# Patient Record
Sex: FEMALE | Race: WHITE | NOT HISPANIC OR LATINO | URBAN - METROPOLITAN AREA
[De-identification: names, ages, dates, MRNs, and addresses within clinical notes are randomized per-mention and may not be internally consistent; named-entity substitution may affect disease eponyms.]

---

## 2019-10-02 ENCOUNTER — OFFICE VISIT (OUTPATIENT)
Dept: INTERNAL MEDICINE | Facility: CLINIC | Age: 29
End: 2019-10-02
Payer: COMMERCIAL

## 2019-10-02 VITALS
BODY MASS INDEX: 36.58 KG/M2 | HEIGHT: 65 IN | SYSTOLIC BLOOD PRESSURE: 100 MMHG | DIASTOLIC BLOOD PRESSURE: 84 MMHG | WEIGHT: 219.56 LBS | OXYGEN SATURATION: 99 % | HEART RATE: 65 BPM

## 2019-10-02 DIAGNOSIS — Z30.8 ENCOUNTER FOR OTHER CONTRACEPTIVE MANAGEMENT: ICD-10-CM

## 2019-10-02 DIAGNOSIS — Z00.00 ANNUAL PHYSICAL EXAM: Primary | ICD-10-CM

## 2019-10-02 PROCEDURE — 99999 PR PBB SHADOW E&M-NEW PATIENT-LVL III: CPT | Mod: PBBFAC,,, | Performed by: FAMILY MEDICINE

## 2019-10-02 PROCEDURE — 99385 PREV VISIT NEW AGE 18-39: CPT | Mod: S$GLB,,, | Performed by: FAMILY MEDICINE

## 2019-10-02 PROCEDURE — 99999 PR PBB SHADOW E&M-NEW PATIENT-LVL III: ICD-10-PCS | Mod: PBBFAC,,, | Performed by: FAMILY MEDICINE

## 2019-10-02 PROCEDURE — 99385 PR PREVENTIVE VISIT,NEW,18-39: ICD-10-PCS | Mod: S$GLB,,, | Performed by: FAMILY MEDICINE

## 2019-10-02 RX ORDER — NORGESTIMATE AND ETHINYL ESTRADIOL 7DAYSX3 LO
1 KIT ORAL DAILY
COMMUNITY
End: 2021-01-13 | Stop reason: SDUPTHER

## 2019-10-02 RX ORDER — NORETHINDRONE ACETATE AND ETHINYL ESTRADIOL 1.5-30(21)
1 KIT ORAL DAILY
Qty: 30 TABLET | Refills: 2 | Status: SHIPPED | OUTPATIENT
Start: 2019-10-02 | End: 2020-12-02

## 2019-10-02 NOTE — PROGRESS NOTES
Subjective:       Patient ID: Steffanie Parkinson is a 29 y.o. female.    Chief Complaint: establish care    HPI  This patient is new to me.   Steffanie Parkinson is a 29 y.o. year old female without significant PMH who presents today to establish care.    Complains that she has headaches and nausea, intense smells right before her menstrual cycle. Began in past 5 months or so.   Has spotting throughout the month as well. Has Hx of migraines as a child.   She has been taking her current OCP for several months. She currently uses triphasic OCP.     OB/GYN History   G*P0  LMP:   Sexually active: yes  Contraception: OCP    Health Maintenance  Pap smear: Feb 2019 - normal   Mammogram: n/a  Colon Cancer Screening: n/a  DEXA: n/a  Hepatitis C screening: n/a  Flu vaccine: refused  Tetanus vaccine: 2012  PNA vaccine: n/a  Shingles vaccine: n/a    I personally reviewed Past Medical History, Past Surgical History, Social History, and Family History    Review of Systems   Constitutional: Negative for activity change, chills, fatigue, fever and unexpected weight change.   HENT: Negative for congestion, hearing loss, rhinorrhea, sore throat and trouble swallowing.    Eyes: Negative for discharge and visual disturbance.   Respiratory: Negative for cough, chest tightness, shortness of breath and wheezing.    Cardiovascular: Negative for chest pain, palpitations and leg swelling.   Gastrointestinal: Negative for abdominal pain, blood in stool, constipation, diarrhea, nausea and vomiting.   Endocrine: Negative for polydipsia and polyuria.   Genitourinary: Positive for menstrual problem. Negative for difficulty urinating, dysuria, frequency, hematuria and urgency.   Musculoskeletal: Negative for arthralgias, joint swelling, myalgias and neck pain.   Skin: Negative for rash.   Neurological: Positive for headaches. Negative for dizziness, syncope and weakness.   Psychiatric/Behavioral: Negative for confusion, dysphoric mood and sleep  "disturbance. The patient is not nervous/anxious.        Objective:      Vitals:    10/02/19 1346   BP: 100/84   Pulse: 65   SpO2: 99%   Weight: 99.6 kg (219 lb 9.3 oz)   Height: 5' 5" (1.651 m)     Physical Exam   Constitutional: She is oriented to person, place, and time. She appears well-developed and well-nourished. No distress.   HENT:   Head: Normocephalic and atraumatic.   Right Ear: Hearing normal.   Left Ear: Hearing normal.   Nose: Nose normal.   Mouth/Throat: Oropharynx is clear and moist and mucous membranes are normal. No oropharyngeal exudate.   Eyes: Pupils are equal, round, and reactive to light. Conjunctivae and lids are normal.   Neck: Normal range of motion. No thyroid mass and no thyromegaly present.   Cardiovascular: Normal rate, regular rhythm, S1 normal, S2 normal and intact distal pulses.   No murmur heard.  No lower extremity edema.    Pulmonary/Chest: Effort normal and breath sounds normal. No respiratory distress.   Abdominal: Soft. Normal appearance and bowel sounds are normal. There is no tenderness.   Lymphadenopathy:     She has no cervical adenopathy.        Right: No supraclavicular adenopathy present.        Left: No supraclavicular adenopathy present.   Neurological: She is alert and oriented to person, place, and time.   Skin: Skin is warm and dry. No rash noted.   Psychiatric: She has a normal mood and affect. Her behavior is normal. Thought content normal.   Nursing note and vitals reviewed.      Assessment:       1. Annual physical exam    2. Encounter for other contraceptive management        Plan:   Diagnoses and all orders for this visit:    Annual physical exam    Encounter for other contraceptive management  - Will try switching to another birth control to see if side effects resolve. Patient may have menstrual migraines. Recommended she see gyn as well.   -     norethindrone-ethinyl estradiol-iron (MICROGESTIN FE1.5/30) 1.5 mg-30 mcg (21)/75 mg (7) tablet; Take 1 tablet by " mouth once daily.

## 2020-01-14 ENCOUNTER — OFFICE VISIT (OUTPATIENT)
Dept: INTERNAL MEDICINE | Facility: CLINIC | Age: 30
End: 2020-01-14
Payer: COMMERCIAL

## 2020-01-14 VITALS
HEART RATE: 64 BPM | HEIGHT: 65 IN | BODY MASS INDEX: 37.03 KG/M2 | DIASTOLIC BLOOD PRESSURE: 74 MMHG | OXYGEN SATURATION: 99 % | WEIGHT: 222.25 LBS | SYSTOLIC BLOOD PRESSURE: 120 MMHG

## 2020-01-14 DIAGNOSIS — F41.1 GENERALIZED ANXIETY DISORDER: Primary | ICD-10-CM

## 2020-01-14 DIAGNOSIS — Z00.00 ANNUAL PHYSICAL EXAM: ICD-10-CM

## 2020-01-14 DIAGNOSIS — Z13.1 SCREENING FOR DIABETES MELLITUS: ICD-10-CM

## 2020-01-14 DIAGNOSIS — Z13.220 SCREENING FOR LIPID DISORDERS: ICD-10-CM

## 2020-01-14 PROCEDURE — 3008F BODY MASS INDEX DOCD: CPT | Mod: CPTII,S$GLB,, | Performed by: FAMILY MEDICINE

## 2020-01-14 PROCEDURE — 99214 OFFICE O/P EST MOD 30 MIN: CPT | Mod: S$GLB,,, | Performed by: FAMILY MEDICINE

## 2020-01-14 PROCEDURE — 3008F PR BODY MASS INDEX (BMI) DOCUMENTED: ICD-10-PCS | Mod: CPTII,S$GLB,, | Performed by: FAMILY MEDICINE

## 2020-01-14 PROCEDURE — 99999 PR PBB SHADOW E&M-EST. PATIENT-LVL III: ICD-10-PCS | Mod: PBBFAC,,, | Performed by: FAMILY MEDICINE

## 2020-01-14 PROCEDURE — 99214 PR OFFICE/OUTPT VISIT, EST, LEVL IV, 30-39 MIN: ICD-10-PCS | Mod: S$GLB,,, | Performed by: FAMILY MEDICINE

## 2020-01-14 PROCEDURE — 99999 PR PBB SHADOW E&M-EST. PATIENT-LVL III: CPT | Mod: PBBFAC,,, | Performed by: FAMILY MEDICINE

## 2020-01-14 RX ORDER — PROPRANOLOL HYDROCHLORIDE 10 MG/1
10 TABLET ORAL 3 TIMES DAILY PRN
Qty: 90 TABLET | Refills: 0 | Status: SHIPPED | OUTPATIENT
Start: 2020-01-14 | End: 2020-02-07

## 2020-01-14 RX ORDER — ESCITALOPRAM OXALATE 10 MG/1
10 TABLET ORAL DAILY
Qty: 30 TABLET | Refills: 2 | Status: SHIPPED | OUTPATIENT
Start: 2020-01-14 | End: 2020-04-13

## 2020-01-14 NOTE — PROGRESS NOTES
Subjective:       Patient ID: Steffanie Parkinson is a 29 y.o. female.    Chief Complaint: anxiety     HPI  Steffanie Parkinson is a 29 y.o. year old female without significant PMH who presents today complaining of anxiety.     Patient complains of anxiety episodes in which her hands are tingling, she has trouble concentrating, feels more irritable, feels worried. Says she is easily overwhelmed. Denies feeling down or depressed. Denies suicidal ideation. Sleeping well. Appetite ok.   She began to see a therapist 5-6 years ago, feels that this does help.  She tried a friend's propranolol 10 mg once and it helped with her anxiety.     OB/GYN History   G*P0  LMP:   Sexually active: yes  Contraception: OCP    Health Maintenance  Pap smear: Feb 2019 - normal   Mammogram: n/a  Colon Cancer Screening: n/a  DEXA: n/a  Hepatitis C screening: n/a  Flu vaccine: refused  Tetanus vaccine: 2012 per patient   PNA vaccine: n/a  Shingles vaccine: n/a    I personally reviewed Past Medical History, Past Surgical History, Social History, and Family History    Review of Systems   Constitutional: Negative for chills, fatigue, fever and unexpected weight change.   HENT: Negative for congestion, hearing loss, rhinorrhea and sore throat.    Eyes: Negative for visual disturbance.   Respiratory: Negative for cough, shortness of breath and wheezing.    Cardiovascular: Negative for chest pain, palpitations and leg swelling.   Gastrointestinal: Negative for abdominal pain, constipation, diarrhea, nausea and vomiting.   Genitourinary: Negative for dysuria, frequency, menstrual problem and urgency.   Musculoskeletal: Negative for arthralgias and myalgias.   Skin: Negative for rash.   Neurological: Negative for dizziness, syncope and headaches.   Psychiatric/Behavioral: Negative for dysphoric mood, self-injury, sleep disturbance and suicidal ideas. The patient is nervous/anxious.        Objective:      Vitals:    01/14/20 1116   BP: 120/74   Pulse: 64  "  SpO2: 99%   Weight: 100.8 kg (222 lb 3.6 oz)   Height: 5' 5" (1.651 m)     Physical Exam   Constitutional: She is oriented to person, place, and time. She appears well-developed and well-nourished. No distress.   HENT:   Head: Normocephalic and atraumatic.   Eyes: Conjunctivae and EOM are normal.   Neck: Normal range of motion.   Cardiovascular: Normal rate, regular rhythm and normal heart sounds.   No murmur heard.  Pulmonary/Chest: Effort normal and breath sounds normal. No respiratory distress.   Abdominal: Soft. Bowel sounds are normal. There is no tenderness.   Neurological: She is alert and oriented to person, place, and time.   Skin: Skin is warm and dry. No rash noted.   Psychiatric: She has a normal mood and affect. Her behavior is normal. Thought content normal.   Nursing note and vitals reviewed.      Assessment:       1. Generalized anxiety disorder    2. Annual physical exam    3. Screening for diabetes mellitus    4. Screening for lipid disorders        Plan:     Generalized anxiety disorder  This is a new problem. Discussed treatment options with patient. She agreed to pharmacotherapy. Will start Lexapro. Discussed side effects. RTC in 6-8 weeks for follow-up. Discussed lifestyle changes such as regular exercise, meditation, cutting back on caffeine. Will also try propanolol PRN.   -     escitalopram oxalate (LEXAPRO) 10 MG tablet; Take 1 tablet (10 mg total) by mouth once daily.  Dispense: 30 tablet; Refill: 2  -     propranolol (INDERAL) 10 MG tablet; Take 1 tablet (10 mg total) by mouth 3 (three) times daily as needed (for anxiety).  Dispense: 90 tablet; Refill: 0    Annual physical exam  -     CBC auto differential; Future; Expected date: 01/14/2020  -     Comprehensive metabolic panel; Future; Expected date: 01/14/2020  -     Lipid panel; Future; Expected date: 01/14/2020  -     TSH; Future; Expected date: 01/14/2020    Screening for diabetes mellitus  -     Comprehensive metabolic panel; " Future; Expected date: 01/14/2020    Screening for lipid disorders  -     Lipid panel; Future; Expected date: 01/14/2020

## 2020-01-27 PROBLEM — F41.1 GENERALIZED ANXIETY DISORDER: Status: ACTIVE | Noted: 2020-01-27

## 2020-02-07 DIAGNOSIS — F41.1 GENERALIZED ANXIETY DISORDER: ICD-10-CM

## 2020-02-07 RX ORDER — PROPRANOLOL HYDROCHLORIDE 10 MG/1
10 TABLET ORAL 3 TIMES DAILY PRN
Qty: 90 TABLET | Refills: 0 | Status: SHIPPED | OUTPATIENT
Start: 2020-02-07 | End: 2020-08-24

## 2020-04-11 DIAGNOSIS — F41.1 GENERALIZED ANXIETY DISORDER: ICD-10-CM

## 2020-04-13 RX ORDER — ESCITALOPRAM OXALATE 10 MG/1
TABLET ORAL
Qty: 90 TABLET | Refills: 0 | Status: SHIPPED | OUTPATIENT
Start: 2020-04-13 | End: 2020-07-08

## 2020-07-15 ENCOUNTER — TELEPHONE (OUTPATIENT)
Dept: INTERNAL MEDICINE | Facility: CLINIC | Age: 30
End: 2020-07-15

## 2020-07-15 DIAGNOSIS — R19.7 DIARRHEA, UNSPECIFIED TYPE: Primary | ICD-10-CM

## 2020-07-15 NOTE — TELEPHONE ENCOUNTER
----- Message from Azalia Hutchins sent at 7/15/2020  2:10 PM CDT -----  Contact: Self [phone]  Type: Patient Call Back    Who called: Self    What is the request in detail: pt is calling because she is having a lot of stomach issues and she has been having diarrhea for 6 days now and she tested negative for COVID but she wondering what she should do    Can the clinic reply by [facility]? Call back    Would the patient rather a call back or a response via [facility]? Call back    Best call back number: [phone]

## 2020-07-16 NOTE — TELEPHONE ENCOUNTER
Please see if there is an opening for her to come in and see me or someone else in the office to evaluate this diarrhea.  If we do not have any openings then we can try having her see GI.    Thanks!

## 2020-07-16 NOTE — TELEPHONE ENCOUNTER
Scheduled patient an appointment with ADIEL Wallace for her to call the office back to confirm appointment.

## 2020-07-16 NOTE — PROGRESS NOTES
Subjective:       Patient ID: Steffanie Parkinson is a 30 y.o. female.    Chief Complaint:  Diarrhea     HPI  Steffanie Parkinson is a 30 y.o. year old female with obesity, anxiety who presents today complaining of diarrhea.    Patient complains of 6-7 days of diarrhea.  Describes her stool as liquid.  Says she also has urgency and has had accidents twice.  Said that at 1 point she didn't even realize that she was losing stool.  Last week she did have some abdominal cramping, but no pain now.  Said heating pad did help.  The pain was located mainly on the left side.  Denies nausea, vomiting, fever, blood in stool, appetite loss  She does mention occasionally having diarrhea off and on for many years.  This is the worst that it has been.  She recently cut out dairy, bread, pasta as much as possible to see if that helped.  She is currently mainly eating meat and vegetables.  Said she thinks it helped a little bit.  She is drinking Gatorade.  She has tried Kaopectate, Imodium, Tylenol.  Kaopectate seems to work well for her.  Denies any recent travel, camping.  No sick contacts.  She is otherwise feeling well.    Anxiety - compliant with Lexapro 10 mg daily.  Reports her symptoms are well controlled now.  She has not needed to use propranolol very much lately.    OB/GYN History   G*P0  LMP: 1.5 weeks ago  Sexually active: yes  Contraception: OCP    Health Maintenance  Pap smear: Feb 2019 - normal   Mammogram: n/a  Colon Cancer Screening: n/a  DEXA: n/a  Hepatitis C screening: n/a  Flu vaccine: refused  Tetanus vaccine: 2012 per patient   PNA vaccine: n/a  Shingles vaccine: n/a    I personally reviewed Past Medical History, Past Surgical History, Social History, and Family History    Review of Systems   Constitutional: Negative for chills, fatigue and fever.   HENT: Negative for congestion, hearing loss and rhinorrhea.    Eyes: Negative for visual disturbance.   Respiratory: Negative for cough, shortness of breath and  "wheezing.    Cardiovascular: Negative for chest pain.   Gastrointestinal: Positive for diarrhea. Negative for abdominal pain, blood in stool, constipation, nausea and vomiting.   Skin: Negative for rash.   Neurological: Negative for dizziness, syncope and headaches.   Psychiatric/Behavioral: Negative for dysphoric mood and sleep disturbance. The patient is not nervous/anxious.        Objective:      Vitals:    07/17/20 1423   BP: 127/77   Pulse: 88   SpO2: 97%   Weight: 105.2 kg (231 lb 14.8 oz)   Height: 5' 5" (1.651 m)     Physical Exam  Vitals signs and nursing note reviewed.   Constitutional:       General: She is not in acute distress.     Appearance: She is well-developed. She is obese.   HENT:      Head: Normocephalic and atraumatic.   Eyes:      Conjunctiva/sclera: Conjunctivae normal.   Neck:      Musculoskeletal: Normal range of motion.   Cardiovascular:      Rate and Rhythm: Normal rate and regular rhythm.      Heart sounds: Normal heart sounds. No murmur.   Pulmonary:      Effort: Pulmonary effort is normal. No respiratory distress.      Breath sounds: Normal breath sounds.   Abdominal:      General: Bowel sounds are normal.      Palpations: Abdomen is soft.      Tenderness: There is no abdominal tenderness.   Skin:     General: Skin is warm and dry.      Findings: No rash.   Neurological:      Mental Status: She is alert and oriented to person, place, and time.   Psychiatric:         Behavior: Behavior normal.         Thought Content: Thought content normal.         Assessment:       1. Diarrhea, unspecified type    2. Generalized anxiety disorder    3. Annual physical exam    4. Screening for diabetes mellitus        Plan:     Diarrhea, unspecified type  This new problem. Labs and stool studies ordered to evaluate due to duration of diarrhea and chronicity.  Advised patient to try bland diet for the next few days.  She will continue Kaopectate as needed.  Encouraged her to work on staying hydrated as " well.  May need colonoscopy if it continues.  Return to clinic if symptoms worsen or do not improve.  -     Tissue transglutaminase, IgA; Future; Expected date: 07/17/2020  -     IgA; Future; Expected date: 07/17/2020  -     Comprehensive metabolic panel; Future; Expected date: 07/17/2020  -     Occult blood x 1, stool; Future; Expected date: 07/17/2020  -     WBC, Stool; Future; Expected date: 07/17/2020  -     Calprotectin; Future; Expected date: 07/17/2020  -     Giardia / Cryptosporidum, EIA; Future; Expected date: 07/17/2020  -     Stool Exam-Ova,Cysts,Parasites; Future; Expected date: 07/17/2020  -     Clostridium difficile EIA; Future; Expected date: 07/17/2020  -     Stool culture; Future; Expected date: 07/17/2020    Generalized anxiety disorder  Controlled. Continue current medication regimen.

## 2020-07-17 ENCOUNTER — OFFICE VISIT (OUTPATIENT)
Dept: INTERNAL MEDICINE | Facility: CLINIC | Age: 30
End: 2020-07-17
Payer: COMMERCIAL

## 2020-07-17 ENCOUNTER — LAB VISIT (OUTPATIENT)
Dept: LAB | Facility: OTHER | Age: 30
End: 2020-07-17
Attending: FAMILY MEDICINE
Payer: COMMERCIAL

## 2020-07-17 VITALS
WEIGHT: 231.94 LBS | OXYGEN SATURATION: 97 % | HEART RATE: 88 BPM | BODY MASS INDEX: 38.64 KG/M2 | SYSTOLIC BLOOD PRESSURE: 127 MMHG | DIASTOLIC BLOOD PRESSURE: 77 MMHG | HEIGHT: 65 IN

## 2020-07-17 DIAGNOSIS — Z00.00 ANNUAL PHYSICAL EXAM: ICD-10-CM

## 2020-07-17 DIAGNOSIS — Z13.1 SCREENING FOR DIABETES MELLITUS: ICD-10-CM

## 2020-07-17 DIAGNOSIS — R19.7 DIARRHEA, UNSPECIFIED TYPE: ICD-10-CM

## 2020-07-17 DIAGNOSIS — F41.1 GENERALIZED ANXIETY DISORDER: ICD-10-CM

## 2020-07-17 DIAGNOSIS — R19.7 DIARRHEA, UNSPECIFIED TYPE: Primary | ICD-10-CM

## 2020-07-17 LAB
ALBUMIN SERPL BCP-MCNC: 3.3 G/DL (ref 3.5–5.2)
ALP SERPL-CCNC: 33 U/L (ref 55–135)
ALT SERPL W/O P-5'-P-CCNC: 28 U/L (ref 10–44)
ANION GAP SERPL CALC-SCNC: 11 MMOL/L (ref 8–16)
AST SERPL-CCNC: 25 U/L (ref 10–40)
BASOPHILS # BLD AUTO: 0.04 K/UL (ref 0–0.2)
BASOPHILS NFR BLD: 0.3 % (ref 0–1.9)
BILIRUB SERPL-MCNC: 0.3 MG/DL (ref 0.1–1)
BUN SERPL-MCNC: 12 MG/DL (ref 6–20)
CALCIUM SERPL-MCNC: 8.9 MG/DL (ref 8.7–10.5)
CHLORIDE SERPL-SCNC: 105 MMOL/L (ref 95–110)
CO2 SERPL-SCNC: 24 MMOL/L (ref 23–29)
CREAT SERPL-MCNC: 0.8 MG/DL (ref 0.5–1.4)
DIFFERENTIAL METHOD: ABNORMAL
EOSINOPHIL # BLD AUTO: 0.5 K/UL (ref 0–0.5)
EOSINOPHIL NFR BLD: 3.4 % (ref 0–8)
ERYTHROCYTE [DISTWIDTH] IN BLOOD BY AUTOMATED COUNT: 12.5 % (ref 11.5–14.5)
EST. GFR  (AFRICAN AMERICAN): >60 ML/MIN/1.73 M^2
EST. GFR  (NON AFRICAN AMERICAN): >60 ML/MIN/1.73 M^2
GLUCOSE SERPL-MCNC: 102 MG/DL (ref 70–110)
HCT VFR BLD AUTO: 38.4 % (ref 37–48.5)
HGB BLD-MCNC: 12.7 G/DL (ref 12–16)
IGA SERPL-MCNC: 183 MG/DL (ref 40–350)
IMM GRANULOCYTES # BLD AUTO: 0.04 K/UL (ref 0–0.04)
IMM GRANULOCYTES NFR BLD AUTO: 0.3 % (ref 0–0.5)
LYMPHOCYTES # BLD AUTO: 4.1 K/UL (ref 1–4.8)
LYMPHOCYTES NFR BLD: 30.3 % (ref 18–48)
MCH RBC QN AUTO: 28.8 PG (ref 27–31)
MCHC RBC AUTO-ENTMCNC: 33.1 G/DL (ref 32–36)
MCV RBC AUTO: 87 FL (ref 82–98)
MONOCYTES # BLD AUTO: 0.7 K/UL (ref 0.3–1)
MONOCYTES NFR BLD: 5 % (ref 4–15)
NEUTROPHILS # BLD AUTO: 8.2 K/UL (ref 1.8–7.7)
NEUTROPHILS NFR BLD: 60.7 % (ref 38–73)
NRBC BLD-RTO: 0 /100 WBC
PLATELET # BLD AUTO: 309 K/UL (ref 150–350)
PMV BLD AUTO: 9.6 FL (ref 9.2–12.9)
POTASSIUM SERPL-SCNC: 3.9 MMOL/L (ref 3.5–5.1)
PROT SERPL-MCNC: 6.5 G/DL (ref 6–8.4)
RBC # BLD AUTO: 4.41 M/UL (ref 4–5.4)
SODIUM SERPL-SCNC: 140 MMOL/L (ref 136–145)
TSH SERPL DL<=0.005 MIU/L-ACNC: 3.84 UIU/ML (ref 0.4–4)
WBC # BLD AUTO: 13.5 K/UL (ref 3.9–12.7)

## 2020-07-17 PROCEDURE — 36415 COLL VENOUS BLD VENIPUNCTURE: CPT

## 2020-07-17 PROCEDURE — 3008F PR BODY MASS INDEX (BMI) DOCUMENTED: ICD-10-PCS | Mod: CPTII,S$GLB,, | Performed by: FAMILY MEDICINE

## 2020-07-17 PROCEDURE — 99999 PR PBB SHADOW E&M-EST. PATIENT-LVL III: CPT | Mod: PBBFAC,,, | Performed by: FAMILY MEDICINE

## 2020-07-17 PROCEDURE — 83036 HEMOGLOBIN GLYCOSYLATED A1C: CPT

## 2020-07-17 PROCEDURE — 82784 ASSAY IGA/IGD/IGG/IGM EACH: CPT

## 2020-07-17 PROCEDURE — 99214 OFFICE O/P EST MOD 30 MIN: CPT | Mod: S$GLB,,, | Performed by: FAMILY MEDICINE

## 2020-07-17 PROCEDURE — 3008F BODY MASS INDEX DOCD: CPT | Mod: CPTII,S$GLB,, | Performed by: FAMILY MEDICINE

## 2020-07-17 PROCEDURE — 99999 PR PBB SHADOW E&M-EST. PATIENT-LVL III: ICD-10-PCS | Mod: PBBFAC,,, | Performed by: FAMILY MEDICINE

## 2020-07-17 PROCEDURE — 85025 COMPLETE CBC W/AUTO DIFF WBC: CPT

## 2020-07-17 PROCEDURE — 99214 PR OFFICE/OUTPT VISIT, EST, LEVL IV, 30-39 MIN: ICD-10-PCS | Mod: S$GLB,,, | Performed by: FAMILY MEDICINE

## 2020-07-17 PROCEDURE — 83516 IMMUNOASSAY NONANTIBODY: CPT

## 2020-07-17 PROCEDURE — 80053 COMPREHEN METABOLIC PANEL: CPT

## 2020-07-17 PROCEDURE — 84443 ASSAY THYROID STIM HORMONE: CPT

## 2020-07-17 NOTE — PATIENT INSTRUCTIONS

## 2020-07-18 LAB
ESTIMATED AVG GLUCOSE: 100 MG/DL (ref 68–131)
HBA1C MFR BLD HPLC: 5.1 % (ref 4–5.6)

## 2020-07-20 LAB — TTG IGA SER-ACNC: 4 UNITS

## 2020-07-27 ENCOUNTER — LAB VISIT (OUTPATIENT)
Dept: LAB | Facility: OTHER | Age: 30
End: 2020-07-27
Attending: FAMILY MEDICINE
Payer: COMMERCIAL

## 2020-07-27 DIAGNOSIS — R19.7 DIARRHEA, UNSPECIFIED TYPE: ICD-10-CM

## 2020-07-27 LAB
C DIFF GDH STL QL: NEGATIVE
C DIFF TOX A+B STL QL IA: NEGATIVE
OB PNL STL: NEGATIVE
WBC #/AREA STL HPF: NORMAL /[HPF]

## 2020-07-27 PROCEDURE — 87324 CLOSTRIDIUM AG IA: CPT

## 2020-07-27 PROCEDURE — 89055 LEUKOCYTE ASSESSMENT FECAL: CPT

## 2020-07-27 PROCEDURE — 82272 OCCULT BLD FECES 1-3 TESTS: CPT

## 2020-07-27 PROCEDURE — 87427 SHIGA-LIKE TOXIN AG IA: CPT

## 2020-07-27 PROCEDURE — 87046 STOOL CULTR AEROBIC BACT EA: CPT

## 2020-07-27 PROCEDURE — 87209 SMEAR COMPLEX STAIN: CPT

## 2020-07-27 PROCEDURE — 83993 ASSAY FOR CALPROTECTIN FECAL: CPT

## 2020-07-27 PROCEDURE — 87045 FECES CULTURE AEROBIC BACT: CPT

## 2020-07-27 PROCEDURE — 87449 NOS EACH ORGANISM AG IA: CPT

## 2020-07-27 PROCEDURE — 87329 GIARDIA AG IA: CPT

## 2020-07-28 DIAGNOSIS — A07.1 GIARDIASIS: ICD-10-CM

## 2020-07-28 LAB
CRYPTOSP AG STL QL IA: NEGATIVE
G LAMBLIA AG STL QL IA: POSITIVE

## 2020-07-28 RX ORDER — TINIDAZOLE 500 MG/1
2 TABLET ORAL ONCE
Qty: 4 TABLET | Refills: 0 | Status: SHIPPED | OUTPATIENT
Start: 2020-07-28 | End: 2020-07-28

## 2020-07-29 LAB
E COLI SXT1 STL QL IA: NEGATIVE
E COLI SXT2 STL QL IA: NEGATIVE
O+P STL MICRO: ABNORMAL

## 2020-07-30 LAB — BACTERIA STL CULT: NORMAL

## 2020-08-04 LAB — CALPROTECTIN STL-MCNT: 78.6 MCG/G

## 2020-10-10 DIAGNOSIS — F41.1 GENERALIZED ANXIETY DISORDER: ICD-10-CM

## 2020-10-10 NOTE — LETTER
October 23, 2020    Steffanie Iggy  [address] [address]             [facility] [address]  [place] [address]  Phone: [phone]  Fax: [phone] .Steffanie Blanchard!    Your Lexapro refill was denied. Dr. Breanne Wood is no longer practicing with [facility]. Please let us know if you are interested in establishing care with another provider and we can assist you with making an appointment. The physicians who are currently accepting new patients are as follows:    [facility] ([address], [address], [place] [address]):  - Ortiz Paige M.D. (part-time on Mondays and Fridays)  - Aditi Klein M.D.  - Kenneth Huang M.D.  - Javier Vasquez M.D.  - Jacquelin Anderson M.D.    [facility] ([address] [place] [address]):  - Tara Will M.D.  - Lilo Davis M.D.  - Waqas Blackburn M.D.  - Andreea Mism M.D.  - Filippo Orosco M.D. (Monday and Friday only at [facility])    [facility] ([address] [place] [address]):  - Sharon Montesinos M.D.  - Stacy Montilla M.D.  - Filippo Pang M.D.  - MERRITT Torres M.D.  - Carlos Rodriguez M.D.    We look forward to further assisting you. Please let us know if you have any additional questions or concerns.    Thank you!  [facility]

## 2020-10-22 RX ORDER — ESCITALOPRAM OXALATE 10 MG/1
TABLET ORAL
Qty: 90 TABLET | Refills: 0 | OUTPATIENT
Start: 2020-10-22

## 2020-12-01 NOTE — PROGRESS NOTES
[facility] Note    Chief Complaint      Chief Complaint   Patient presents with    Establish Care       History of Present Illness      Steffanie Parkinson is a 30 y.o. WF with MERCEDES, Morbid Obesity, and migraines without aura presents to est PCP and for fu MERCEDES.     MERCEDES - Pt on Lexapro 10 mg/d but ran out 4 wks ago. Warned pt about potential S.E. Pt aware of FDA/BB warning. Pt aware of potential for withdrawal with abrupt discontinuation.   Pt will alert MD for any concerns incl SI/HI.  She did gain wt unsure if related to her Lexapro.  Could consider changing to Effexor or Fluoxetine. She would like to try Fluoxetine.     Morbid obesity - BMI - 40.17 -  Up 22 lb since Oct. 2019.     Migraine - Occur around cycles.  + Photosensitiviy.  No Aura.     HCM - Flu - none - defers;  Tdap - '11? - pt defers; Gardasil - none;  MGM - none? - due at 40;  PAP - '19 - at [facility] - wnl; Hep C Screen - none- declines;  HIV Screen - none - declnes; C-scope - none;   Prev PCP - Dr. Wood; Ob/GYN -     Past Medical History:  Past Medical History:   Diagnosis Date    Migraine without aura and without status migrainosus, not intractable 12/2/2020    Migraines     Morbid obesity 12/2/2020       Past Surgical History:   has a past surgical history that includes none.    Family History:  family history includes Alcohol abuse in her maternal grandfather; Bipolar disorder in her maternal aunt; Drug abuse in her mother; Kidney disease in her mother; Nephrolithiasis in her maternal uncle; No Known Problems in her maternal grandmother.     Social History:  Social History     Tobacco Use    Smoking status: Never Smoker    Smokeless tobacco: Never Used   Substance Use Topics    Alcohol use: Yes     Alcohol/week: 9.0 standard drinks     Types: 9 Glasses of wine per week     Frequency: 4 or more times a week     Drinks per session: 1 or 2     Binge frequency: Never    Drug use: Never       Review of Systems   Constitutional:  Negative for chills, diaphoresis and fever.   HENT: Negative for hearing loss and tinnitus.    Eyes: Negative for blurred vision and double vision.   Respiratory: Positive for cough. Negative for shortness of breath.         Slight dry cough - COVID neg recently - 10 days ago.    Cardiovascular: Negative for chest pain and palpitations.   Gastrointestinal: Positive for diarrhea and nausea. Negative for abdominal pain, blood in stool, constipation, heartburn, melena and vomiting.        Off and on.    Genitourinary: Negative for dysuria and frequency.   Musculoskeletal: Negative for joint pain and myalgias.   Skin: Negative for itching and rash.   Neurological: Negative for dizziness and headaches.   Endo/Heme/Allergies: Does not bruise/bleed easily.   Psychiatric/Behavioral: Negative for depression and suicidal ideas.        Medications:  Outpatient Encounter Medications as of 12/2/2020   Medication Sig Dispense Refill    norgestimate-ethinyl estradiol (ORTHO TRI-CYCLEN LO, 28,) 0.18/0.215/0.25 mg-25 mcg tablet Take 1 tablet by mouth once daily.      propranoloL (INDERAL) 10 MG tablet TAKE 1 TABLET BY MOUTH THREE TIMES A DAY AS NEEDED FOR ANXIETY 90 tablet 0    [DISCONTINUED] escitalopram oxalate (LEXAPRO) 10 MG tablet Take 1 tablet (10 mg total) by mouth once daily. 90 tablet 0    FLUoxetine 10 MG capsule Take 1 capsule (10 mg total) by mouth once daily. 30 capsule 1    [DISCONTINUED] desogestreL-ethinyl estradioL (ENSKYCE) 0.15-0.03 mg per tablet 1 tablet      [DISCONTINUED] norethindrone-ethinyl estradiol-iron (MICROGESTIN FE1.5/30) 1.5 mg-30 mcg (21)/75 mg (7) tablet Take 1 tablet by mouth once daily. (Patient not taking: Reported on 12/2/2020) 30 tablet 2     No facility-administered encounter medications on file as of 12/2/2020.        Current Outpatient Medications:     norgestimate-ethinyl estradiol (ORTHO TRI-CYCLEN LO, 28,) 0.18/0.215/0.25 mg-25 mcg tablet, Take 1 tablet by mouth once daily., Disp:  ", Rfl:     propranoloL (INDERAL) 10 MG tablet, TAKE 1 TABLET BY MOUTH THREE TIMES A DAY AS NEEDED FOR ANXIETY, Disp: 90 tablet, Rfl: 0    FLUoxetine 10 MG capsule, Take 1 capsule (10 mg total) by mouth once daily., Disp: 30 capsule, Rfl: 1    Allergies:  Review of patient's allergies indicates:  No Known Allergies    Health Maintenance:  Immunization History   Administered Date(s) Administered    Td (ADULT) 05/07/2008      Health Maintenance   Topic Date Due    Hepatitis C Screening  1990    Lipid Panel  1990    TETANUS VACCINE  05/07/2018      Objective:      Vital Signs  Temp: 97.8 °F (36.6 °C)  Pulse: 76  Resp: 18  SpO2: 98 %  BP: 124/64  BP Location: Left arm  Patient Position: Sitting  Pain Score: 0-No pain  Height and Weight  Height: 5' 5" (165.1 cm)  Weight: 109.5 kg (241 lb 6.5 oz)  BSA (Calculated - sq m): 2.24 sq meters  BMI (Calculated): 40.2  Weight in (lb) to have BMI = 25: 149.9]    Laboratory:  CBC:  Recent Labs   Lab 07/17/20  1517   WBC 13.50 H   RBC 4.41   Hemoglobin 12.7   Hematocrit 38.4   Platelets 309   MCV 87   MCH 28.8   MCHC 33.1       CMP:  Recent Labs   Lab 07/17/20  1517   Glucose 102   Calcium 8.9   Albumin 3.3 L   Total Protein 6.5   Sodium 140   Potassium 3.9   CO2 24   Chloride 105   BUN 12   Creatinine 0.8   eGFR if  >60   eGFR if non African American >60   Alkaline Phosphatase 33 L   ALT 28   AST 25   Total Bilirubin 0.3       LIPIDS:  Recent Labs   Lab 07/17/20  1517   TSH 3.837       TSH:  Recent Labs   Lab 07/17/20  1517   TSH 3.837       A1C:  Recent Labs   Lab 07/17/20  1517   Hemoglobin A1C 5.1       Physical Exam  Vitals signs reviewed.   Constitutional:       General: She is not in acute distress.     Appearance: Normal appearance. She is not ill-appearing, toxic-appearing or diaphoretic.      Comments: Morbidly obese   HENT:      Head: Normocephalic and atraumatic.      Right Ear: Tympanic membrane normal.      Left Ear: Tympanic membrane " normal.   Eyes:      Extraocular Movements: Extraocular movements intact.      Conjunctiva/sclera: Conjunctivae normal.      Pupils: Pupils are equal, round, and reactive to light.   Cardiovascular:      Rate and Rhythm: Normal rate and regular rhythm.      Pulses: Normal pulses.      Heart sounds: Normal heart sounds.   Pulmonary:      Effort: Pulmonary effort is normal. No respiratory distress.      Breath sounds: Normal breath sounds. No wheezing or rhonchi.   Abdominal:      General: Bowel sounds are normal. There is no distension.      Palpations: Abdomen is soft.      Tenderness: There is no abdominal tenderness. There is no guarding or rebound.   Musculoskeletal: Normal range of motion.   Skin:     General: Skin is warm and dry.   Neurological:      General: No focal deficit present.      Mental Status: She is alert and oriented to person, place, and time.   Psychiatric:         Mood and Affect: Mood normal.         Behavior: Behavior normal.             Assessment:       1. Generalized anxiety disorder    2. Migraine without aura and without status migrainosus, not intractable    3. Morbid obesity        Steffanie Parkinson is a 30 y.o.female with:    1. Generalized anxiety disorder  - FLUoxetine 10 MG capsule; Take 1 capsule (10 mg total) by mouth once daily.  Dispense: 30 capsule; Refill: 1  - Will start a trial of Fluoxetine.     2. Morbid obesity  - Ambulatory referral/consult to Nutrition Services; Future  -Refer to Nutrition.  Rec low carb diet and exercise for wt loss.        Chronic conditions status updated as per HPI.  Other than changes above, cont current medications and maintain follow up with specialists.  Return to clinic in 6 wks to fu well visit.     Tara Will MD  [facility]

## 2020-12-02 ENCOUNTER — OFFICE VISIT (OUTPATIENT)
Dept: PRIMARY CARE CLINIC | Facility: CLINIC | Age: 30
End: 2020-12-02
Payer: COMMERCIAL

## 2020-12-02 VITALS
RESPIRATION RATE: 18 BRPM | TEMPERATURE: 98 F | OXYGEN SATURATION: 98 % | BODY MASS INDEX: 40.22 KG/M2 | HEIGHT: 65 IN | SYSTOLIC BLOOD PRESSURE: 124 MMHG | HEART RATE: 76 BPM | WEIGHT: 241.38 LBS | DIASTOLIC BLOOD PRESSURE: 64 MMHG

## 2020-12-02 DIAGNOSIS — F41.1 GENERALIZED ANXIETY DISORDER: Primary | ICD-10-CM

## 2020-12-02 DIAGNOSIS — E66.01 MORBID OBESITY: ICD-10-CM

## 2020-12-02 PROBLEM — G43.009 MIGRAINE WITHOUT AURA AND WITHOUT STATUS MIGRAINOSUS, NOT INTRACTABLE: Status: ACTIVE | Noted: 2020-12-02

## 2020-12-02 PROCEDURE — 99213 OFFICE O/P EST LOW 20 MIN: CPT | Mod: S$GLB,,, | Performed by: INTERNAL MEDICINE

## 2020-12-02 PROCEDURE — 1126F PR PAIN SEVERITY QUANTIFIED, NO PAIN PRESENT: ICD-10-PCS | Mod: S$GLB,,, | Performed by: INTERNAL MEDICINE

## 2020-12-02 PROCEDURE — 3008F BODY MASS INDEX DOCD: CPT | Mod: CPTII,S$GLB,, | Performed by: INTERNAL MEDICINE

## 2020-12-02 PROCEDURE — 1126F AMNT PAIN NOTED NONE PRSNT: CPT | Mod: S$GLB,,, | Performed by: INTERNAL MEDICINE

## 2020-12-02 PROCEDURE — 99999 PR PBB SHADOW E&M-EST. PATIENT-LVL IV: ICD-10-PCS | Mod: PBBFAC,,, | Performed by: INTERNAL MEDICINE

## 2020-12-02 PROCEDURE — 99999 PR PBB SHADOW E&M-EST. PATIENT-LVL IV: CPT | Mod: PBBFAC,,, | Performed by: INTERNAL MEDICINE

## 2020-12-02 PROCEDURE — 99213 PR OFFICE/OUTPT VISIT, EST, LEVL III, 20-29 MIN: ICD-10-PCS | Mod: S$GLB,,, | Performed by: INTERNAL MEDICINE

## 2020-12-02 PROCEDURE — 3008F PR BODY MASS INDEX (BMI) DOCUMENTED: ICD-10-PCS | Mod: CPTII,S$GLB,, | Performed by: INTERNAL MEDICINE

## 2020-12-02 RX ORDER — FLUOXETINE 10 MG/1
10 CAPSULE ORAL DAILY
Qty: 30 CAPSULE | Refills: 1 | Status: SHIPPED | OUTPATIENT
Start: 2020-12-02 | End: 2020-12-31

## 2020-12-02 RX ORDER — DESOGESTREL AND ETHINYL ESTRADIOL 0.15-0.03
KIT ORAL
COMMUNITY
End: 2020-12-02

## 2020-12-02 NOTE — PATIENT INSTRUCTIONS
Fluoxetine capsules or tablets (Depression/Mood Disorders)  What is this medicine?  FLUOXETINE (floo OX e teen) belongs to a class of drugs known as selective serotonin reuptake inhibitors (SSRIs). It helps to treat mood problems such as depression, obsessive compulsive disorder, and panic attacks. It can also treat certain eating disorders.  How should I use this medicine?  Take this medicine by mouth with a glass of water. Follow the directions on the prescription label. You can take this medicine with or without food. Take your medicine at regular intervals. Do not take it more often than directed. Do not stop taking this medicine suddenly except upon the advice of your doctor. Stopping this medicine too quickly may cause serious side effects or your condition may worsen.  A special MedGuide will be given to you by the pharmacist with each prescription and refill. Be sure to read this information carefully each time.  Talk to your pediatrician regarding the use of this medicine in children. While this drug may be prescribed for children as young as 7 years for selected conditions, precautions do apply.  What side effects may I notice from receiving this medicine?  Side effects that you should report to your doctor or health care professional as soon as possible:  · allergic reactions like skin rash, itching or hives, swelling of the face, lips, or tongue  · breathing problems  · confusion  · eye pain, changes in vision  · fast or irregular heart rate, palpitations  · flu-like fever, chills, cough, muscle or joint aches and pains  · seizures  · suicidal thoughts or other mood changes  · swelling or redness in or around the eye  · tremors  · trouble sleeping  · unusual bleeding or bruising  · unusually tired or weak  · vomiting  Side effects that usually do not require medical attention (report to your doctor or health care professional if they continue or are bothersome):  · change in sex drive or  performance  · diarrhea  · dry mouth  · flushing  · headache  · increased or decreased appetite  · nausea  · sweating  What may interact with this medicine?  Do not take fluoxetine with any of the following medications:  · other medicines containing fluoxetine, like Sarafem or Symbyax  · cisapride  · linezolid  · MAOIs like Carbex, Eldepryl, Marplan, Nardil, and Parnate  · methylene blue (injected into a vein)  · pimozide  · thioridazine  This medicine may also interact with the following medications:  · alcohol  · aspirin and aspirin-like medicines  · carbamazepine  · certain medicines for depression, anxiety, or psychotic disturbances  · certain medicines for migraine headaches like almotriptan, eletriptan, frovatriptan, naratriptan, rizatriptan, sumatriptan, zolmitriptan  · digoxin  · diuretics  · fentanyl  · flecainide  · furazolidone  · isoniazid  · lithium  · medicines for sleep  · medicines that treat or prevent blood clots like warfarin, enoxaparin, and dalteparin  · NSAIDs, medicines for pain and inflammation, like ibuprofen or naproxen  · phenytoin  · procarbazine  · propafenone  · rasagiline  · ritonavir  · supplements like [place]'s wort, kava kava, valerian  · tramadol  · tryptophan  · vinblastine  What if I miss a dose?  If you miss a dose, skip the missed dose and go back to your regular dosing schedule. Do not take double or extra doses.  Where should I keep my medicine?  Keep out of the reach of children.  Store at room temperature between 15 and 30 degrees C (59 and 86 degrees F). Throw away any unused medicine after the expiration date.  What should I tell my health care provider before I take this medicine?  They need to know if you have any of these conditions:  · bipolar disorder or flores  · diabetes  · glaucoma  · liver disease  · psychosis  · seizures  · suicidal thoughts or history of attempted suicide  · an unusual or allergic reaction to fluoxetine, other medicines, foods, dyes, or  preservatives  · pregnant or trying to get pregnant  · breast-feeding  What should I watch for while using this medicine?  Tell your doctor if your symptoms do not get better or if they get worse. Visit your doctor or health care professional for regular checks on your progress. Because it may take several weeks to see the full effects of this medicine, it is important to continue your treatment as prescribed by your doctor.  Patients and their families should watch out for new or worsening thoughts of suicide or depression. Also watch out for sudden changes in feelings such as feeling anxious, agitated, panicky, irritable, hostile, aggressive, impulsive, severely restless, overly excited and hyperactive, or not being able to sleep. If this happens, especially at the beginning of treatment or after a change in dose, call your health care professional.  You may get drowsy or dizzy. Do not drive, use machinery, or do anything that needs mental alertness until you know how this medicine affects you. Do not stand or sit up quickly, especially if you are an older patient. This reduces the risk of dizzy or fainting spells. Alcohol may interfere with the effect of this medicine. Avoid alcoholic drinks.  Your mouth may get dry. Chewing sugarless gum or sucking hard candy, and drinking plenty of water may help. Contact your doctor if the problem does not go away or is severe.  This medicine may affect blood sugar levels. If you have diabetes, check with your doctor or health care professional before you change your diet or the dose of your diabetic medicine.  NOTE:This sheet is a summary. It may not cover all possible information. If you have questions about this medicine, talk to your doctor, pharmacist, or health care provider. Copyright© 2017 [employer]

## 2020-12-30 ENCOUNTER — NUTRITION (OUTPATIENT)
Dept: NUTRITION | Facility: CLINIC | Age: 30
End: 2020-12-30
Payer: COMMERCIAL

## 2020-12-30 VITALS — WEIGHT: 245.38 LBS | BODY MASS INDEX: 40.88 KG/M2 | HEIGHT: 65 IN

## 2020-12-30 DIAGNOSIS — Z71.3 DIETARY COUNSELING: ICD-10-CM

## 2020-12-30 DIAGNOSIS — E66.01 MORBID OBESITY: Primary | ICD-10-CM

## 2020-12-30 PROCEDURE — 97802 MEDICAL NUTRITION INDIV IN: CPT | Mod: S$GLB,,, | Performed by: DIETITIAN, REGISTERED

## 2020-12-30 PROCEDURE — 99999 PR PBB SHADOW E&M-EST. PATIENT-LVL III: CPT | Mod: PBBFAC,,,

## 2020-12-30 PROCEDURE — 99999 PR PBB SHADOW E&M-EST. PATIENT-LVL III: ICD-10-PCS | Mod: PBBFAC,,,

## 2020-12-30 PROCEDURE — 97802 PR MED NUTR THER, 1ST, INDIV, EA 15 MIN: ICD-10-PCS | Mod: S$GLB,,, | Performed by: DIETITIAN, REGISTERED

## 2020-12-30 NOTE — PROGRESS NOTES
"Referring Physician:Tara Will MD     Reason for visit:  Chief Complaint   Patient presents with    Obesity    Nutrition Counseling      Initial Visit    :1990     Allergies Reviewed  Meds Reviewed    Anthropometrics  Weight:111.3 kg (245 lb 6 oz)  Height:5' 5" (1.651 m)  BMI:Body mass index is 40.83 kg/m².   IBW:   56.8 kg  +/-10%    Meds:  Outpatient Medications Prior to Visit   Medication Sig Dispense Refill    FLUoxetine 10 MG capsule Take 1 capsule (10 mg total) by mouth once daily. 30 capsule 1    norgestimate-ethinyl estradiol (ORTHO TRI-CYCLEN LO, 28,) 0.18/0.215/0.25 mg-25 mcg tablet Take 1 tablet by mouth once daily.      propranoloL (INDERAL) 10 MG tablet TAKE 1 TABLET BY MOUTH THREE TIMES A DAY AS NEEDED FOR ANXIETY 90 tablet 0     No facility-administered medications prior to visit.        Food/Drug Interactions Noted:  n/a    Vitamins/Supplements/Herbs:  none    Labs:  Reviewed 2020 labs     Nutrition Prescription:  1704 Kcals/day( 30 kcal/kg IBW),   46 g protein( 0.8 g/kg IBW)     Support System:    Pt states she is recently ; she and her  grocery shop together and share cooking responsibilities    Diet Hx:   Pt states this is the heaviest she has ever been, and reports 40 lb weight gain over past year which she attributes to COVID stay-at-home.  Continues to work from home.  States she has multiple food "cravings" ie meat, cheese, carbs, fast food.  Craves meat especially in the mornings.  States their preferred eating regimen is half plate of rice, and half plate of meat and vegetables cooked together.  Prefers whole, fresh foods to processed foods.  Drinks 2 glasses of wine nightly.     Breakfast: scrambled eggs with tortilla or beans, avocado with tortilla or premade chicken salad sandwich.  Coffee with coconut creamer. 4 oz glass of fresh-squeezed OJ  Lunch:   Eats with , and likes to cook:  Fried rice (with leftover rice) or leftovers.  " Water.  Dinner:   Last night:  Curried pork in coconut milk with alycia rice.  2 glasses wine.  Water.  Snack:  Sliced kosher pickle with block pepperjack cheese and Ritz crackers    Current activity level and/or physical limitations:  No exercise at this time; no apparent physical limitations    Motivation to make changes/anticipated barriers and/or expected adherence:    Pt's reported food cravings may be barrier to adherence    Nutrition-Focus Physical Findings:  Pt wearing face covering per COVID19 protocol; pt appears well nourished    Assessment:  Pt attentive and asked relevant questions about foods/beverages recommended & to avoid; reading food labels; sample meal plan and menus; portion control; grocery shopping and cooking tips; healthy snacks.  All questions answered, and she verbalized understanding of information.  Pt reports she will have challenges reducing her portion sizes of carbs and proteins.    Nutrition Diagnosis:  Obesity RT excess energy intake and physical inactivity AEB BMI > 40    Recommendations:  1500 calorie, low fat, high fiber diet. Exercise goal:  30 minutes per day, 3-5 days per week  Handouts provided and reviewed:  Cardiac-TLC Nutrition Therapy; 1500 Calorie Sample 5-Day Menus; Weight Loss Tips; Cooking Tips for Weight Management; Get Fit Shopping List; Eat Fit Plan...Anytime/Anywhere;  Servings of Carbohydrates for Meal Planning; Walking Works; My Plate Planner;  Heart Healthy Eating:  Shopping Tips and Label Reading Tips; [facility] Snack List for Diabetes      Strategies Implemented:  Portion control    Consultation Time:40 minutes. (Note:  Pt arrived at 10:59 for 10:30 appt)  Communicated with referring healthcare provider:  Consult note available in pt's Epic chart per MD discretion  Follow Up:  Pt provided with dietitian contact number and advised to call with questions or make future appointment if further intervention needed.

## 2021-01-04 ENCOUNTER — PATIENT MESSAGE (OUTPATIENT)
Dept: ADMINISTRATIVE | Facility: HOSPITAL | Age: 31
End: 2021-01-04

## 2021-01-13 ENCOUNTER — OFFICE VISIT (OUTPATIENT)
Dept: PRIMARY CARE CLINIC | Facility: CLINIC | Age: 31
End: 2021-01-13
Payer: COMMERCIAL

## 2021-01-13 VITALS
SYSTOLIC BLOOD PRESSURE: 115 MMHG | TEMPERATURE: 98 F | HEIGHT: 65 IN | HEART RATE: 80 BPM | BODY MASS INDEX: 41 KG/M2 | WEIGHT: 246.06 LBS | DIASTOLIC BLOOD PRESSURE: 72 MMHG | RESPIRATION RATE: 19 BRPM | OXYGEN SATURATION: 99 %

## 2021-01-13 DIAGNOSIS — E66.01 MORBID OBESITY: ICD-10-CM

## 2021-01-13 DIAGNOSIS — F41.1 GENERALIZED ANXIETY DISORDER: ICD-10-CM

## 2021-01-13 DIAGNOSIS — Z00.00 NORMAL PHYSICAL EXAM, ROUTINE: Primary | ICD-10-CM

## 2021-01-13 DIAGNOSIS — Z30.9 ENCOUNTER FOR CONTRACEPTIVE MANAGEMENT, UNSPECIFIED TYPE: ICD-10-CM

## 2021-01-13 DIAGNOSIS — R73.01 IFG (IMPAIRED FASTING GLUCOSE): ICD-10-CM

## 2021-01-13 DIAGNOSIS — Z13.220 SCREENING FOR LIPOID DISORDERS: ICD-10-CM

## 2021-01-13 PROCEDURE — 99395 PREV VISIT EST AGE 18-39: CPT | Mod: S$GLB,,, | Performed by: INTERNAL MEDICINE

## 2021-01-13 PROCEDURE — 99395 PR PREVENTIVE VISIT,EST,18-39: ICD-10-PCS | Mod: S$GLB,,, | Performed by: INTERNAL MEDICINE

## 2021-01-13 PROCEDURE — 1126F AMNT PAIN NOTED NONE PRSNT: CPT | Mod: S$GLB,,, | Performed by: INTERNAL MEDICINE

## 2021-01-13 PROCEDURE — 3008F BODY MASS INDEX DOCD: CPT | Mod: CPTII,S$GLB,, | Performed by: INTERNAL MEDICINE

## 2021-01-13 PROCEDURE — 99999 PR PBB SHADOW E&M-EST. PATIENT-LVL III: ICD-10-PCS | Mod: PBBFAC,,, | Performed by: INTERNAL MEDICINE

## 2021-01-13 PROCEDURE — 99999 PR PBB SHADOW E&M-EST. PATIENT-LVL III: CPT | Mod: PBBFAC,,, | Performed by: INTERNAL MEDICINE

## 2021-01-13 PROCEDURE — 3008F PR BODY MASS INDEX (BMI) DOCUMENTED: ICD-10-PCS | Mod: CPTII,S$GLB,, | Performed by: INTERNAL MEDICINE

## 2021-01-13 PROCEDURE — 1126F PR PAIN SEVERITY QUANTIFIED, NO PAIN PRESENT: ICD-10-PCS | Mod: S$GLB,,, | Performed by: INTERNAL MEDICINE

## 2021-01-13 RX ORDER — NORGESTIMATE AND ETHINYL ESTRADIOL 7DAYSX3 LO
1 KIT ORAL DAILY
Qty: 90 TABLET | Refills: 3 | Status: SHIPPED | OUTPATIENT
Start: 2021-01-13 | End: 2021-11-10

## 2021-04-05 ENCOUNTER — PATIENT MESSAGE (OUTPATIENT)
Dept: ADMINISTRATIVE | Facility: HOSPITAL | Age: 31
End: 2021-04-05

## 2021-04-19 PROBLEM — Z13.220 SCREENING FOR LIPOID DISORDERS: Status: RESOLVED | Noted: 2021-01-13 | Resolved: 2021-04-19

## 2021-04-19 PROBLEM — Z00.00 NORMAL PHYSICAL EXAM, ROUTINE: Status: RESOLVED | Noted: 2021-01-13 | Resolved: 2021-04-19

## 2021-07-07 ENCOUNTER — PATIENT MESSAGE (OUTPATIENT)
Dept: ADMINISTRATIVE | Facility: HOSPITAL | Age: 31
End: 2021-07-07

## 2021-07-14 ENCOUNTER — LAB VISIT (OUTPATIENT)
Dept: LAB | Facility: HOSPITAL | Age: 31
End: 2021-07-14
Attending: INTERNAL MEDICINE
Payer: COMMERCIAL

## 2021-07-14 DIAGNOSIS — Z13.220 SCREENING FOR LIPOID DISORDERS: ICD-10-CM

## 2021-07-14 DIAGNOSIS — Z00.00 NORMAL PHYSICAL EXAM, ROUTINE: ICD-10-CM

## 2021-07-14 DIAGNOSIS — R73.01 IFG (IMPAIRED FASTING GLUCOSE): ICD-10-CM

## 2021-07-14 LAB
BASOPHILS # BLD AUTO: 0.03 K/UL (ref 0–0.2)
BASOPHILS NFR BLD: 0.3 % (ref 0–1.9)
DIFFERENTIAL METHOD: ABNORMAL
EOSINOPHIL # BLD AUTO: 0.6 K/UL (ref 0–0.5)
EOSINOPHIL NFR BLD: 6.2 % (ref 0–8)
ERYTHROCYTE [DISTWIDTH] IN BLOOD BY AUTOMATED COUNT: 13.3 % (ref 11.5–14.5)
ESTIMATED AVG GLUCOSE: 103 MG/DL (ref 68–131)
HBA1C MFR BLD: 5.2 % (ref 4–5.6)
HCT VFR BLD AUTO: 41 % (ref 37–48.5)
HGB BLD-MCNC: 13.1 G/DL (ref 12–16)
IMM GRANULOCYTES # BLD AUTO: 0.02 K/UL (ref 0–0.04)
IMM GRANULOCYTES NFR BLD AUTO: 0.2 % (ref 0–0.5)
LYMPHOCYTES # BLD AUTO: 3.8 K/UL (ref 1–4.8)
LYMPHOCYTES NFR BLD: 38.5 % (ref 18–48)
MCH RBC QN AUTO: 28.2 PG (ref 27–31)
MCHC RBC AUTO-ENTMCNC: 32 G/DL (ref 32–36)
MCV RBC AUTO: 88 FL (ref 82–98)
MONOCYTES # BLD AUTO: 0.5 K/UL (ref 0.3–1)
MONOCYTES NFR BLD: 5.4 % (ref 4–15)
NEUTROPHILS # BLD AUTO: 4.9 K/UL (ref 1.8–7.7)
NEUTROPHILS NFR BLD: 49.4 % (ref 38–73)
NRBC BLD-RTO: 0 /100 WBC
PLATELET # BLD AUTO: 286 K/UL (ref 150–450)
PMV BLD AUTO: 9.7 FL (ref 9.2–12.9)
RBC # BLD AUTO: 4.64 M/UL (ref 4–5.4)
WBC # BLD AUTO: 9.86 K/UL (ref 3.9–12.7)

## 2021-07-14 PROCEDURE — 85025 COMPLETE CBC W/AUTO DIFF WBC: CPT | Performed by: INTERNAL MEDICINE

## 2021-07-14 PROCEDURE — 36415 COLL VENOUS BLD VENIPUNCTURE: CPT | Mod: PN | Performed by: INTERNAL MEDICINE

## 2021-07-14 PROCEDURE — 84443 ASSAY THYROID STIM HORMONE: CPT | Performed by: INTERNAL MEDICINE

## 2021-07-14 PROCEDURE — 84439 ASSAY OF FREE THYROXINE: CPT | Performed by: INTERNAL MEDICINE

## 2021-07-14 PROCEDURE — 80053 COMPREHEN METABOLIC PANEL: CPT | Performed by: INTERNAL MEDICINE

## 2021-07-14 PROCEDURE — 80061 LIPID PANEL: CPT | Performed by: INTERNAL MEDICINE

## 2021-07-14 PROCEDURE — 83036 HEMOGLOBIN GLYCOSYLATED A1C: CPT | Performed by: INTERNAL MEDICINE

## 2021-07-15 LAB
ALBUMIN SERPL BCP-MCNC: 3.4 G/DL (ref 3.5–5.2)
ALP SERPL-CCNC: 34 U/L (ref 55–135)
ALT SERPL W/O P-5'-P-CCNC: 25 U/L (ref 10–44)
ANION GAP SERPL CALC-SCNC: 11 MMOL/L (ref 8–16)
AST SERPL-CCNC: 25 U/L (ref 10–40)
BILIRUB SERPL-MCNC: 0.3 MG/DL (ref 0.1–1)
BUN SERPL-MCNC: 19 MG/DL (ref 6–20)
CALCIUM SERPL-MCNC: 9.4 MG/DL (ref 8.7–10.5)
CHLORIDE SERPL-SCNC: 105 MMOL/L (ref 95–110)
CHOLEST SERPL-MCNC: 196 MG/DL (ref 120–199)
CHOLEST/HDLC SERPL: 3.4 {RATIO} (ref 2–5)
CO2 SERPL-SCNC: 21 MMOL/L (ref 23–29)
CREAT SERPL-MCNC: 0.7 MG/DL (ref 0.5–1.4)
EST. GFR  (AFRICAN AMERICAN): >60 ML/MIN/1.73 M^2
EST. GFR  (NON AFRICAN AMERICAN): >60 ML/MIN/1.73 M^2
GLUCOSE SERPL-MCNC: 93 MG/DL (ref 70–110)
HDLC SERPL-MCNC: 58 MG/DL (ref 40–75)
HDLC SERPL: 29.6 % (ref 20–50)
LDLC SERPL CALC-MCNC: 108.4 MG/DL (ref 63–159)
NONHDLC SERPL-MCNC: 138 MG/DL
POTASSIUM SERPL-SCNC: 4.8 MMOL/L (ref 3.5–5.1)
PROT SERPL-MCNC: 6.8 G/DL (ref 6–8.4)
SODIUM SERPL-SCNC: 137 MMOL/L (ref 136–145)
T4 FREE SERPL-MCNC: 0.78 NG/DL (ref 0.71–1.51)
TRIGL SERPL-MCNC: 148 MG/DL (ref 30–150)
TSH SERPL DL<=0.005 MIU/L-ACNC: 3.68 UIU/ML (ref 0.4–4)

## 2021-07-16 ENCOUNTER — LAB VISIT (OUTPATIENT)
Dept: LAB | Facility: HOSPITAL | Age: 31
End: 2021-07-16
Attending: INTERNAL MEDICINE
Payer: COMMERCIAL

## 2021-07-16 ENCOUNTER — OFFICE VISIT (OUTPATIENT)
Dept: PRIMARY CARE CLINIC | Facility: CLINIC | Age: 31
End: 2021-07-16
Payer: COMMERCIAL

## 2021-07-16 VITALS
RESPIRATION RATE: 19 BRPM | BODY MASS INDEX: 41.54 KG/M2 | OXYGEN SATURATION: 99 % | DIASTOLIC BLOOD PRESSURE: 70 MMHG | SYSTOLIC BLOOD PRESSURE: 121 MMHG | HEART RATE: 82 BPM | HEIGHT: 65 IN | WEIGHT: 249.31 LBS | TEMPERATURE: 98 F

## 2021-07-16 DIAGNOSIS — I73.00 RAYNAUD'S PHENOMENON WITHOUT GANGRENE: ICD-10-CM

## 2021-07-16 DIAGNOSIS — R20.2 PARESTHESIAS: Primary | ICD-10-CM

## 2021-07-16 DIAGNOSIS — R20.2 PARESTHESIAS: ICD-10-CM

## 2021-07-16 DIAGNOSIS — H53.8 BLURRED VISION, BILATERAL: ICD-10-CM

## 2021-07-16 LAB
FOLATE SERPL-MCNC: 5.5 NG/ML (ref 4–24)
VIT B12 SERPL-MCNC: 340 PG/ML (ref 210–950)

## 2021-07-16 PROCEDURE — 82607 VITAMIN B-12: CPT | Performed by: INTERNAL MEDICINE

## 2021-07-16 PROCEDURE — 99214 OFFICE O/P EST MOD 30 MIN: CPT | Mod: S$GLB,,, | Performed by: INTERNAL MEDICINE

## 2021-07-16 PROCEDURE — 82746 ASSAY OF FOLIC ACID SERUM: CPT | Performed by: INTERNAL MEDICINE

## 2021-07-16 PROCEDURE — 3008F PR BODY MASS INDEX (BMI) DOCUMENTED: ICD-10-PCS | Mod: CPTII,S$GLB,, | Performed by: INTERNAL MEDICINE

## 2021-07-16 PROCEDURE — 1126F PR PAIN SEVERITY QUANTIFIED, NO PAIN PRESENT: ICD-10-PCS | Mod: S$GLB,,, | Performed by: INTERNAL MEDICINE

## 2021-07-16 PROCEDURE — 84425 ASSAY OF VITAMIN B-1: CPT | Performed by: INTERNAL MEDICINE

## 2021-07-16 PROCEDURE — 99999 PR PBB SHADOW E&M-EST. PATIENT-LVL IV: CPT | Mod: PBBFAC,,, | Performed by: INTERNAL MEDICINE

## 2021-07-16 PROCEDURE — 99214 PR OFFICE/OUTPT VISIT, EST, LEVL IV, 30-39 MIN: ICD-10-PCS | Mod: S$GLB,,, | Performed by: INTERNAL MEDICINE

## 2021-07-16 PROCEDURE — 36415 COLL VENOUS BLD VENIPUNCTURE: CPT | Mod: PN | Performed by: INTERNAL MEDICINE

## 2021-07-16 PROCEDURE — 3008F BODY MASS INDEX DOCD: CPT | Mod: CPTII,S$GLB,, | Performed by: INTERNAL MEDICINE

## 2021-07-16 PROCEDURE — 1126F AMNT PAIN NOTED NONE PRSNT: CPT | Mod: S$GLB,,, | Performed by: INTERNAL MEDICINE

## 2021-07-16 PROCEDURE — 99999 PR PBB SHADOW E&M-EST. PATIENT-LVL IV: ICD-10-PCS | Mod: PBBFAC,,, | Performed by: INTERNAL MEDICINE

## 2021-07-16 PROCEDURE — 86038 ANTINUCLEAR ANTIBODIES: CPT | Performed by: INTERNAL MEDICINE

## 2021-07-19 LAB — ANA SER QL IF: NORMAL

## 2021-07-20 ENCOUNTER — PATIENT MESSAGE (OUTPATIENT)
Dept: PRIMARY CARE CLINIC | Facility: CLINIC | Age: 31
End: 2021-07-20

## 2021-07-20 LAB — VIT B1 BLD-MCNC: 71 UG/L (ref 38–122)

## 2021-08-26 ENCOUNTER — PATIENT MESSAGE (OUTPATIENT)
Dept: PRIMARY CARE CLINIC | Facility: CLINIC | Age: 31
End: 2021-08-26

## 2021-08-26 DIAGNOSIS — F41.1 GENERALIZED ANXIETY DISORDER: ICD-10-CM

## 2021-08-26 RX ORDER — FLUOXETINE HYDROCHLORIDE 20 MG/1
20 CAPSULE ORAL DAILY
Qty: 30 CAPSULE | Refills: 1 | Status: SHIPPED | OUTPATIENT
Start: 2021-08-26 | End: 2021-09-19

## 2021-09-30 ENCOUNTER — TELEPHONE (OUTPATIENT)
Dept: OPTOMETRY | Facility: CLINIC | Age: 31
End: 2021-09-30

## 2021-09-30 ENCOUNTER — OFFICE VISIT (OUTPATIENT)
Dept: OPTOMETRY | Facility: CLINIC | Age: 31
End: 2021-09-30
Payer: COMMERCIAL

## 2021-09-30 DIAGNOSIS — H04.123 DRY EYE SYNDROME OF BOTH EYES: ICD-10-CM

## 2021-09-30 DIAGNOSIS — H52.13 MYOPIA WITH ASTIGMATISM AND PRESBYOPIA, BILATERAL: Primary | ICD-10-CM

## 2021-09-30 DIAGNOSIS — H52.203 MYOPIA WITH ASTIGMATISM AND PRESBYOPIA, BILATERAL: Primary | ICD-10-CM

## 2021-09-30 DIAGNOSIS — H53.8 BLURRED VISION, BILATERAL: ICD-10-CM

## 2021-09-30 DIAGNOSIS — G43.009 MIGRAINE WITHOUT AURA AND WITHOUT STATUS MIGRAINOSUS, NOT INTRACTABLE: ICD-10-CM

## 2021-09-30 DIAGNOSIS — H52.4 MYOPIA WITH ASTIGMATISM AND PRESBYOPIA, BILATERAL: Primary | ICD-10-CM

## 2021-09-30 PROCEDURE — 99999 PR PBB SHADOW E&M-EST. PATIENT-LVL II: CPT | Mod: PBBFAC,,, | Performed by: OPTOMETRIST

## 2021-09-30 PROCEDURE — 92004 PR EYE EXAM, NEW PATIENT,COMPREHESV: ICD-10-PCS | Mod: S$GLB,,, | Performed by: OPTOMETRIST

## 2021-09-30 PROCEDURE — 92015 PR REFRACTION: ICD-10-PCS | Mod: S$GLB,,, | Performed by: OPTOMETRIST

## 2021-09-30 PROCEDURE — 99999 PR PBB SHADOW E&M-EST. PATIENT-LVL II: ICD-10-PCS | Mod: PBBFAC,,, | Performed by: OPTOMETRIST

## 2021-09-30 PROCEDURE — 92004 COMPRE OPH EXAM NEW PT 1/>: CPT | Mod: S$GLB,,, | Performed by: OPTOMETRIST

## 2021-09-30 PROCEDURE — 92015 DETERMINE REFRACTIVE STATE: CPT | Mod: S$GLB,,, | Performed by: OPTOMETRIST

## 2021-10-05 ENCOUNTER — PATIENT MESSAGE (OUTPATIENT)
Dept: ADMINISTRATIVE | Facility: HOSPITAL | Age: 31
End: 2021-10-05

## 2021-10-05 ENCOUNTER — TELEPHONE (OUTPATIENT)
Dept: PRIMARY CARE CLINIC | Facility: CLINIC | Age: 31
End: 2021-10-05

## 2021-10-16 ENCOUNTER — HOSPITAL ENCOUNTER (EMERGENCY)
Facility: OTHER | Age: 31
Discharge: HOME | End: 2021-10-16
Attending: EMERGENCY MEDICINE
Payer: COMMERCIAL

## 2021-10-16 VITALS
OXYGEN SATURATION: 100 % | TEMPERATURE: 98 F | RESPIRATION RATE: 18 BRPM | DIASTOLIC BLOOD PRESSURE: 88 MMHG | SYSTOLIC BLOOD PRESSURE: 128 MMHG | HEART RATE: 77 BPM

## 2021-10-16 DIAGNOSIS — R11.2 NON-INTRACTABLE VOMITING WITH NAUSEA, UNSPECIFIED VOMITING TYPE: ICD-10-CM

## 2021-10-16 DIAGNOSIS — K92.0 HEMATEMESIS, PRESENCE OF NAUSEA NOT SPECIFIED: Primary | ICD-10-CM

## 2021-10-16 LAB
ALBUMIN SERPL BCP-MCNC: 3.4 G/DL (ref 3.5–5.2)
ALP SERPL-CCNC: 31 U/L (ref 55–135)
ALT SERPL W/O P-5'-P-CCNC: 22 U/L (ref 10–44)
ANION GAP SERPL CALC-SCNC: 12 MMOL/L (ref 8–16)
AST SERPL-CCNC: 23 U/L (ref 10–40)
B-HCG UR QL: NEGATIVE
BASOPHILS # BLD AUTO: 0.03 K/UL (ref 0–0.2)
BASOPHILS NFR BLD: 0.2 % (ref 0–1.9)
BILIRUB SERPL-MCNC: 0.5 MG/DL (ref 0.1–1)
BUN SERPL-MCNC: 16 MG/DL (ref 6–20)
CALCIUM SERPL-MCNC: 9.2 MG/DL (ref 8.7–10.5)
CHLORIDE SERPL-SCNC: 103 MMOL/L (ref 95–110)
CO2 SERPL-SCNC: 22 MMOL/L (ref 23–29)
CREAT SERPL-MCNC: 0.7 MG/DL (ref 0.5–1.4)
CTP QC/QA: YES
DIFFERENTIAL METHOD: ABNORMAL
EOSINOPHIL # BLD AUTO: 0.5 K/UL (ref 0–0.5)
EOSINOPHIL NFR BLD: 3.4 % (ref 0–8)
ERYTHROCYTE [DISTWIDTH] IN BLOOD BY AUTOMATED COUNT: 12.9 % (ref 11.5–14.5)
EST. GFR  (AFRICAN AMERICAN): >60 ML/MIN/1.73 M^2
EST. GFR  (NON AFRICAN AMERICAN): >60 ML/MIN/1.73 M^2
GLUCOSE SERPL-MCNC: 89 MG/DL (ref 70–110)
HCT VFR BLD AUTO: 40.8 % (ref 37–48.5)
HGB BLD-MCNC: 13.3 G/DL (ref 12–16)
IMM GRANULOCYTES # BLD AUTO: 0.04 K/UL (ref 0–0.04)
IMM GRANULOCYTES NFR BLD AUTO: 0.3 % (ref 0–0.5)
LYMPHOCYTES # BLD AUTO: 4.7 K/UL (ref 1–4.8)
LYMPHOCYTES NFR BLD: 33.6 % (ref 18–48)
MCH RBC QN AUTO: 28.4 PG (ref 27–31)
MCHC RBC AUTO-ENTMCNC: 32.6 G/DL (ref 32–36)
MCV RBC AUTO: 87 FL (ref 82–98)
MONOCYTES # BLD AUTO: 0.6 K/UL (ref 0.3–1)
MONOCYTES NFR BLD: 4.4 % (ref 4–15)
NEUTROPHILS # BLD AUTO: 8.2 K/UL (ref 1.8–7.7)
NEUTROPHILS NFR BLD: 58.1 % (ref 38–73)
NRBC BLD-RTO: 0 /100 WBC
PLATELET # BLD AUTO: 308 K/UL (ref 150–450)
PMV BLD AUTO: 9.1 FL (ref 9.2–12.9)
POTASSIUM SERPL-SCNC: 4.2 MMOL/L (ref 3.5–5.1)
PROT SERPL-MCNC: 7 G/DL (ref 6–8.4)
RBC # BLD AUTO: 4.69 M/UL (ref 4–5.4)
SODIUM SERPL-SCNC: 137 MMOL/L (ref 136–145)
WBC # BLD AUTO: 14.06 K/UL (ref 3.9–12.7)

## 2021-10-16 PROCEDURE — 99284 EMERGENCY DEPT VISIT MOD MDM: CPT | Mod: 25

## 2021-10-16 PROCEDURE — 80053 COMPREHEN METABOLIC PANEL: CPT | Performed by: NURSE PRACTITIONER

## 2021-10-16 PROCEDURE — 81025 URINE PREGNANCY TEST: CPT | Performed by: EMERGENCY MEDICINE

## 2021-10-16 PROCEDURE — 85025 COMPLETE CBC W/AUTO DIFF WBC: CPT | Performed by: NURSE PRACTITIONER

## 2021-10-16 RX ORDER — SUCRALFATE 1 G/10ML
1 SUSPENSION ORAL 4 TIMES DAILY
Qty: 414 ML | Refills: 0 | Status: SHIPPED | OUTPATIENT
Start: 2021-10-16 | End: 2021-10-16 | Stop reason: SDUPTHER

## 2021-10-16 RX ORDER — ONDANSETRON 4 MG/1
4 TABLET, ORALLY DISINTEGRATING ORAL EVERY 8 HOURS PRN
Qty: 20 TABLET | Refills: 0 | Status: SHIPPED | OUTPATIENT
Start: 2021-10-16 | End: 2023-02-06

## 2021-10-16 RX ORDER — SUCRALFATE 1 G/10ML
1 SUSPENSION ORAL 4 TIMES DAILY
Qty: 414 ML | Refills: 0 | Status: SHIPPED | OUTPATIENT
Start: 2021-10-16 | End: 2021-10-18

## 2021-10-18 ENCOUNTER — TELEPHONE (OUTPATIENT)
Dept: EMERGENCY MEDICINE | Facility: OTHER | Age: 31
End: 2021-10-18

## 2021-10-18 ENCOUNTER — NURSE TRIAGE (OUTPATIENT)
Dept: ADMINISTRATIVE | Facility: CLINIC | Age: 31
End: 2021-10-18

## 2021-10-18 RX ORDER — SUCRALFATE 1 G/1
1 TABLET ORAL
Qty: 60 TABLET | Refills: 0 | Status: SHIPPED | OUTPATIENT
Start: 2021-10-18 | End: 2021-10-21

## 2021-10-20 ENCOUNTER — TELEPHONE (OUTPATIENT)
Dept: PRIMARY CARE CLINIC | Facility: CLINIC | Age: 31
End: 2021-10-20

## 2021-10-21 ENCOUNTER — OFFICE VISIT (OUTPATIENT)
Dept: PRIMARY CARE CLINIC | Facility: CLINIC | Age: 31
End: 2021-10-21
Payer: COMMERCIAL

## 2021-10-21 ENCOUNTER — PATIENT MESSAGE (OUTPATIENT)
Dept: PRIMARY CARE CLINIC | Facility: CLINIC | Age: 31
End: 2021-10-21

## 2021-10-21 DIAGNOSIS — F41.1 GENERALIZED ANXIETY DISORDER: Primary | ICD-10-CM

## 2021-10-21 DIAGNOSIS — K92.0 HEMATEMESIS WITH NAUSEA: ICD-10-CM

## 2021-10-21 DIAGNOSIS — E66.01 MORBID OBESITY: ICD-10-CM

## 2021-10-21 PROCEDURE — 1160F RVW MEDS BY RX/DR IN RCRD: CPT | Mod: CPTII,95,, | Performed by: INTERNAL MEDICINE

## 2021-10-21 PROCEDURE — 99214 PR OFFICE/OUTPT VISIT, EST, LEVL IV, 30-39 MIN: ICD-10-PCS | Mod: 95,,, | Performed by: INTERNAL MEDICINE

## 2021-10-21 PROCEDURE — 1159F PR MEDICATION LIST DOCUMENTED IN MEDICAL RECORD: ICD-10-PCS | Mod: CPTII,95,, | Performed by: INTERNAL MEDICINE

## 2021-10-21 PROCEDURE — 3044F PR MOST RECENT HEMOGLOBIN A1C LEVEL <7.0%: ICD-10-PCS | Mod: CPTII,95,, | Performed by: INTERNAL MEDICINE

## 2021-10-21 PROCEDURE — 1159F MED LIST DOCD IN RCRD: CPT | Mod: CPTII,95,, | Performed by: INTERNAL MEDICINE

## 2021-10-21 PROCEDURE — 1160F PR REVIEW ALL MEDS BY PRESCRIBER/CLIN PHARMACIST DOCUMENTED: ICD-10-PCS | Mod: CPTII,95,, | Performed by: INTERNAL MEDICINE

## 2021-10-21 PROCEDURE — 3044F HG A1C LEVEL LT 7.0%: CPT | Mod: CPTII,95,, | Performed by: INTERNAL MEDICINE

## 2021-10-21 PROCEDURE — 99214 OFFICE O/P EST MOD 30 MIN: CPT | Mod: 95,,, | Performed by: INTERNAL MEDICINE

## 2021-10-21 RX ORDER — FLUOXETINE HYDROCHLORIDE 40 MG/1
40 CAPSULE ORAL DAILY
Qty: 30 CAPSULE | Refills: 1 | Status: SHIPPED | OUTPATIENT
Start: 2021-10-21 | End: 2021-12-07 | Stop reason: SDUPTHER

## 2021-12-06 ENCOUNTER — TELEPHONE (OUTPATIENT)
Dept: PRIMARY CARE CLINIC | Facility: CLINIC | Age: 31
End: 2021-12-06
Payer: COMMERCIAL

## 2021-12-07 ENCOUNTER — PATIENT MESSAGE (OUTPATIENT)
Dept: PRIMARY CARE CLINIC | Facility: CLINIC | Age: 31
End: 2021-12-07

## 2021-12-07 ENCOUNTER — TELEPHONE (OUTPATIENT)
Dept: PRIMARY CARE CLINIC | Facility: CLINIC | Age: 31
End: 2021-12-07
Payer: COMMERCIAL

## 2021-12-07 ENCOUNTER — OFFICE VISIT (OUTPATIENT)
Dept: PRIMARY CARE CLINIC | Facility: CLINIC | Age: 31
End: 2021-12-07
Payer: COMMERCIAL

## 2021-12-07 DIAGNOSIS — F41.1 GENERALIZED ANXIETY DISORDER: Primary | ICD-10-CM

## 2021-12-07 DIAGNOSIS — F90.9 ATTENTION DEFICIT HYPERACTIVITY DISORDER (ADHD), UNSPECIFIED ADHD TYPE: ICD-10-CM

## 2021-12-07 PROCEDURE — 99213 OFFICE O/P EST LOW 20 MIN: CPT | Mod: 95,,, | Performed by: INTERNAL MEDICINE

## 2021-12-07 PROCEDURE — 99213 PR OFFICE/OUTPT VISIT, EST, LEVL III, 20-29 MIN: ICD-10-PCS | Mod: 95,,, | Performed by: INTERNAL MEDICINE

## 2021-12-07 RX ORDER — FLUOXETINE HYDROCHLORIDE 40 MG/1
40 CAPSULE ORAL DAILY
Qty: 90 CAPSULE | Refills: 1 | Status: SHIPPED | OUTPATIENT
Start: 2021-12-07 | End: 2022-07-13

## 2021-12-07 RX ORDER — LISDEXAMFETAMINE DIMESYLATE 30 MG/1
30 CAPSULE ORAL EVERY MORNING
Qty: 1 CAPSULE | Refills: 0
Start: 2021-12-07 | End: 2023-10-30

## 2021-12-22 ENCOUNTER — PATIENT MESSAGE (OUTPATIENT)
Dept: PRIMARY CARE CLINIC | Facility: CLINIC | Age: 31
End: 2021-12-22
Payer: COMMERCIAL

## 2021-12-22 DIAGNOSIS — F90.9 ATTENTION DEFICIT HYPERACTIVITY DISORDER (ADHD), UNSPECIFIED ADHD TYPE: ICD-10-CM

## 2021-12-22 RX ORDER — LISDEXAMFETAMINE DIMESYLATE 30 MG/1
30 CAPSULE ORAL EVERY MORNING
Qty: 1 CAPSULE | Refills: 0
Start: 2021-12-22

## 2022-01-18 ENCOUNTER — PATIENT MESSAGE (OUTPATIENT)
Dept: ADMINISTRATIVE | Facility: HOSPITAL | Age: 32
End: 2022-01-18
Payer: COMMERCIAL

## 2022-05-30 ENCOUNTER — PATIENT MESSAGE (OUTPATIENT)
Dept: ADMINISTRATIVE | Facility: HOSPITAL | Age: 32
End: 2022-05-30
Payer: COMMERCIAL

## 2022-07-12 ENCOUNTER — PATIENT MESSAGE (OUTPATIENT)
Dept: ADMINISTRATIVE | Facility: HOSPITAL | Age: 32
End: 2022-07-12
Payer: COMMERCIAL

## 2022-10-10 ENCOUNTER — PATIENT MESSAGE (OUTPATIENT)
Dept: ADMINISTRATIVE | Facility: HOSPITAL | Age: 32
End: 2022-10-10
Payer: COMMERCIAL

## 2022-12-07 ENCOUNTER — OFFICE VISIT (OUTPATIENT)
Dept: URGENT CARE | Facility: CLINIC | Age: 32
End: 2022-12-07
Payer: COMMERCIAL

## 2022-12-07 VITALS
WEIGHT: 240 LBS | BODY MASS INDEX: 38.57 KG/M2 | DIASTOLIC BLOOD PRESSURE: 77 MMHG | HEART RATE: 80 BPM | OXYGEN SATURATION: 96 % | TEMPERATURE: 98 F | SYSTOLIC BLOOD PRESSURE: 135 MMHG | HEIGHT: 66 IN

## 2022-12-07 DIAGNOSIS — T14.8XXA HEMATOMA AND CONTUSION: ICD-10-CM

## 2022-12-07 DIAGNOSIS — S53.402A ELBOW SPRAIN, LEFT, INITIAL ENCOUNTER: ICD-10-CM

## 2022-12-07 DIAGNOSIS — S59.902A ELBOW INJURY, LEFT, INITIAL ENCOUNTER: Primary | ICD-10-CM

## 2022-12-07 PROCEDURE — 3078F DIAST BP <80 MM HG: CPT | Mod: CPTII,S$GLB,, | Performed by: PHYSICIAN ASSISTANT

## 2022-12-07 PROCEDURE — 73080 X-RAY EXAM OF ELBOW: CPT | Mod: LT,S$GLB,, | Performed by: RADIOLOGY

## 2022-12-07 PROCEDURE — 1159F MED LIST DOCD IN RCRD: CPT | Mod: CPTII,S$GLB,, | Performed by: PHYSICIAN ASSISTANT

## 2022-12-07 PROCEDURE — 3008F BODY MASS INDEX DOCD: CPT | Mod: CPTII,S$GLB,, | Performed by: PHYSICIAN ASSISTANT

## 2022-12-07 PROCEDURE — 3075F PR MOST RECENT SYSTOLIC BLOOD PRESS GE 130-139MM HG: ICD-10-PCS | Mod: CPTII,S$GLB,, | Performed by: PHYSICIAN ASSISTANT

## 2022-12-07 PROCEDURE — 1159F PR MEDICATION LIST DOCUMENTED IN MEDICAL RECORD: ICD-10-PCS | Mod: CPTII,S$GLB,, | Performed by: PHYSICIAN ASSISTANT

## 2022-12-07 PROCEDURE — 3075F SYST BP GE 130 - 139MM HG: CPT | Mod: CPTII,S$GLB,, | Performed by: PHYSICIAN ASSISTANT

## 2022-12-07 PROCEDURE — 73080 XR ELBOW COMPLETE 3 VIEW LEFT: ICD-10-PCS | Mod: LT,S$GLB,, | Performed by: RADIOLOGY

## 2022-12-07 PROCEDURE — 99214 OFFICE O/P EST MOD 30 MIN: CPT | Mod: S$GLB,,, | Performed by: PHYSICIAN ASSISTANT

## 2022-12-07 PROCEDURE — 99214 PR OFFICE/OUTPT VISIT, EST, LEVL IV, 30-39 MIN: ICD-10-PCS | Mod: S$GLB,,, | Performed by: PHYSICIAN ASSISTANT

## 2022-12-07 PROCEDURE — 3078F PR MOST RECENT DIASTOLIC BLOOD PRESSURE < 80 MM HG: ICD-10-PCS | Mod: CPTII,S$GLB,, | Performed by: PHYSICIAN ASSISTANT

## 2022-12-07 PROCEDURE — 3008F PR BODY MASS INDEX (BMI) DOCUMENTED: ICD-10-PCS | Mod: CPTII,S$GLB,, | Performed by: PHYSICIAN ASSISTANT

## 2022-12-07 RX ORDER — DEXTROAMPHETAMINE SULFATE, DEXTROAMPHETAMINE SACCHARATE, AMPHETAMINE SULFATE AND AMPHETAMINE ASPARTATE 5; 5; 5; 5 MG/1; MG/1; MG/1; MG/1
CAPSULE, EXTENDED RELEASE ORAL EVERY MORNING
COMMUNITY
Start: 2022-10-20 | End: 2024-01-16

## 2022-12-08 NOTE — PATIENT INSTRUCTIONS
PLEASE READ YOUR DISCHARGE INSTRUCTIONS ENTIRELY AS IT CONTAINS IMPORTANT INFORMATION.  - OTC Tylenol/anti-inflammatory as needed for pain (see below). These medications can be obtained over the counter at any local pharmacy without requiring a prescription.   OTC ORAL medications for pain reliever or fever reducing:  - Acetaminophen (tylenol 650mg every 8 hour as needed with food) or Ibuprofen (advil,motrin 400-600mg every 8 hour with food as needed) as directed as needed for fever/pain. Avoid tylenol if you have a history of liver disease or allergic reactions. Do not take ibuprofen if you have a history of allergic reactions, stomach bleeding ulcers, kidney disease, or if you take blood thinners.  -The patient was advised that NSAID-type medications have two very important potential side effects: gastrointestinal irritation including hemorrhage and renal injuries. patient was asked to take the medication with food and to stop if patient experiences any GI upset. I asked patient to call for vomiting, abdominal pain or black/bloody stools. The patient expresses understanding of these issues and all questions were answered.    OTC TOPICAL meds for pain reliever :  -You can apply OTC diclofenac or Volatren Gel 2-3 times daily to muscle or joints.  -You can also apply OTC lidocaine 4-5% with OR without menthol ointment 2-3 daily to muscle or joints.  -Please let one absorb before using the other. Do not use both at the same time as it may decrease efficacy. Please stop using if you develop any skin rash or irritation.  -Please wash your hands after application of these topical products.     - can continue OTC brace, splint or wrap as needed to help with your symptoms.  - continue heat (muscle) /ice (bone/joint) compression, rice therapy, and muscle stretches.   - Radiographs and all diagnostic testing reviewed with patient  - if no improvement or worsening symptoms, recommend follow-up with *orthopedics or PCP for  further evaluation.  Please call the number below to set up appointment; if a referral has been placed.  - Follow up with your PCP or specialty clinic as directed.  You can call [phone] or [phone] to schedule an appointment with the appropriate provider.      -You must understand that you've received an Urgent Care treatment only and that you may be released before all your medical problems are known or treated. You, the patient, will arrange for follow up care as instructed. Please arrange follow up with your primary medical clinic within 2-5 days if your signs and symptoms have not resolved or worsen.     - If your condition worsens or fails to improve we recommend that you receive another evaluation at the emergency room immediately or contact your primary medical clinic to discuss your concerns in next 2-5 days.  Strict ER versus clinic precautions given.      RED FLAGS/WARNING SYMPTOMS DISCUSSED WITH PATIENT THAT WOULD WARRANT EMERGENT MEDICAL ATTENTION. Patient aware and verbalized understanding.      RICE     Rest an injury, elevate it, and use ice and compression as directed.   RICE stands for rest, ice, compression, and elevation. These can limit pain and swelling after an injury. RICE may be recommended to help treat fractures, sprains, strains, and bruises or bumps.   Home care  The following explain the details of RICE:  Rest. Limit the use of the injured body part. This helps prevent further damage to the body part and gives it time to heal. In some cases, you may need a sling, brace, splint, or cast to help keep the body part still until it has healed.  Ice. Applying ice right after an injury helps relieve pain and swelling. Wrap a bag of ice in a thin towel. Then, place it over the injured area. Do this for 10 to 15 minutes every 3 to 4 hours. Continue for the next 1 to 3 days or until your symptoms improve. Never put ice directly on your skin or ice an area longer than 15 minutes at a  time.  Compression. Putting pressure on an injury helps reduce swelling and provides support. Wrap the injured area firmly with an elastic bandage/wrap. Make sure not to wrap the bandage too tightly or you will cut off blood flow to the injured area. If your bandage loosens, rewrap it.  Elevation. Keeping an injury raised above the level of your heart reduces swelling, pain, and throbbing. For instance, if you have a broken leg, it may help to rest your leg on several pillows when sitting or lying down. Try to keep the injured area elevated for at least 2 to 3 hours per day.  Follow-up care  Follow up with your healthcare provider, or as advised.  When to seek medical advice  Call your healthcare provider right away if any of these occur:  Fever of 100.4°F (38°C) or higher, or as directed by your healthcare provider  Increased pain or swelling in the injured body part  Injured body part becomes cold, blue, numb, or tingly  Signs of infection. These include warmth in the skin, redness, drainage, or bad smell coming from the injured body part.  Date Last Reviewed: 1/18/2016  © 0297-1812 [employer]. [address] [place] [address], [place] [address]. All rights reserved. This information is not intended as a substitute for professional medical care. Always follow your healthcare professional's instructions.

## 2022-12-08 NOTE — PROGRESS NOTES
"Subjective:       Patient ID: Steffanie Parkinson is a 32 y.o. female.    Vitals:  height is 5' 6" (1.676 m) and weight is 108.9 kg (240 lb). Her temporal temperature is 97.7 °F (36.5 °C). Her blood pressure is 135/77 and her pulse is 80. Her oxygen saturation is 96%.   Body mass index is 38.74 kg/m².      Chief Complaint: Fall    32-year-old female with history of morbid obesity BMI 30 and migraines who presents to urgent care clinic for evaluation.  Symptoms started Saturday when she was roller skating with her coworkers.  She fell multiple times on her left buttocks and her left arm.  There is bruising on her left elbow.  Left elbow pain occurs with elbow rotation or flexion.  No pain at rest.  When pain occurs, it is rated as 3/10.  Taking Aleve as needed for pain the 1st couple days.  No radiating arm pain/weakness, bladder bowel incontinence, saddle anesthesia, gait instability, or loss of consciousness.  She feels like her left elbow pain is fairly mild but is requesting x-rays for further evaluation.      Medical assistant note:  Patient states that she having left elbow pain. Patient states that she went skating on Saturday and fall on elbow.Pain level 1.Patient states that she having pain with movement     Fall  Incident onset: 4 days ago. Fall occurred: while skating. She fell from a height of 3 to 5 ft. There was no blood loss. The point of impact was the left elbow. The pain is present in the left elbow. The pain is at a severity of 3/10. The patient is experiencing no pain. The symptoms are aggravated by flexion, pressure on injury and rotation. Pertinent negatives include no abdominal pain, bowel incontinence, fever, headaches, hematuria, loss of consciousness, nausea, numbness or vomiting. She has tried NSAID for the symptoms. The treatment provided moderate relief.     Constitution: Negative for activity change, appetite change, chills, sweating, fatigue, fever and generalized weakness.   HENT:  " Negative for ear pain, hearing loss, facial swelling, congestion, postnasal drip, sinus pain, sinus pressure, sore throat, trouble swallowing and voice change.    Neck: Negative for neck pain, neck stiffness and painful lymph nodes.   Cardiovascular:  Negative for chest pain, leg swelling, palpitations, sob on exertion and passing out.   Eyes:  Negative for eye discharge, eye pain, photophobia, vision loss, double vision and blurred vision.   Respiratory:  Negative for chest tightness, cough, sputum production, bloody sputum, COPD, shortness of breath, stridor, wheezing and asthma.    Gastrointestinal:  Negative for abdominal pain, nausea, vomiting, constipation, diarrhea, bright red blood in stool, rectal bleeding, heartburn and bowel incontinence.   Genitourinary:  Negative for dysuria, frequency, urgency, urine decreased, flank pain, bladder incontinence and hematuria.   Musculoskeletal:  Positive for trauma, joint pain and joint swelling. Negative for abnormal ROM of joint, muscle cramps and muscle ache.   Skin:  Positive for bruising. Negative for color change, pale, rash and wound.   Allergic/Immunologic: Negative for seasonal allergies, asthma and immunocompromised state.   Neurological:  Negative for dizziness, history of vertigo, light-headedness, passing out, facial drooping, speech difficulty, coordination disturbances, loss of balance, headaches, disorientation, altered mental status, loss of consciousness, numbness, tingling and seizures.   Hematologic/Lymphatic: Negative for swollen lymph nodes, easy bruising/bleeding and trouble clotting. Does not bruise/bleed easily.   Psychiatric/Behavioral:  Negative for altered mental status and disorientation.        Past Medical History:   Diagnosis Date    Migraine without aura and without status migrainosus, not intractable 12/2/2020    Migraines     Morbid obesity 12/2/2020       Objective:      Physical Exam   Constitutional: She appears well-developed. She  is cooperative.  Non-toxic appearance. She does not appear ill. No distress.      Comments:Well-appearing   obesity  HENT:   Head: Normocephalic and atraumatic.   Ears:   Right Ear: Hearing, external ear and ear canal normal.   Left Ear: Hearing, external ear and ear canal normal.   Nose: Nose normal.   Mouth/Throat: Uvula is midline, oropharynx is clear and moist and mucous membranes are normal. Mucous membranes are moist. No posterior oropharyngeal edema. No tonsillar exudate. Oropharynx is clear.   Eyes: Conjunctivae, EOM and lids are normal. Pupils are equal, round, and reactive to light. Right eye exhibits no discharge. Left eye exhibits no discharge. Extraocular movement intact   Neck: Neck supple. No neck rigidity present.   Cardiovascular: Normal rate, regular rhythm and normal pulses.   Pulmonary/Chest: Effort normal. No accessory muscle usage. No respiratory distress. She has no wheezes. She exhibits no tenderness.   Abdominal: Normal appearance. She exhibits no distension. Soft. There is no abdominal tenderness.   Musculoskeletal: Normal range of motion.         General: Swelling, tenderness and signs of injury present. Normal range of motion.      Right lower leg: No edema.      Left lower leg: No edema.      Comments: Moves all extremities with normal tone, strength, and ROM.    Left lateral elbow with green superficial bruising.  Send Letter upon  Left medial biceps with 3 x 3 cm of superficial purple bruising and tenderness to palpation.    Left elbow pain with flexion less than left elbow supination/pronation.   Neurological: no focal deficit. She is alert and at baseline. She has normal motor skills and normal sensation. She displays no weakness, facial symmetry and normal reflexes. No cranial nerve deficit or sensory deficit. She exhibits normal muscle tone. Gait and coordination normal. Coordination normal.   Skin: Skin is warm, dry, not diaphoretic and no rash. Capillary refill takes less than 2  seconds. bruising   Psychiatric: Her behavior is normal. Mood and thought content normal.   Nursing note and vitals reviewed.        XR ELBOW COMPLETE 3 VIEW LEFT    Result Date: 12/7/2022  EXAMINATION: XR ELBOW COMPLETE 3 VIEW LEFT CLINICAL HISTORY: Unspecified injury of left elbow, initial encounter TECHNIQUE: AP, lateral, and oblique views of the left elbow were performed. COMPARISON: None FINDINGS: Three views left elbow. No significant displacement of the anterior or posterior elbow fat pads.  The anterior humeral line and radiocapitellar line are in appropriate orientation.  No acute displaced fracture or dislocation of the elbow.  No radiopaque foreign body.     1. No acute displaced fracture or dislocation of the left elbow. Electronically signed by: Subhash Glez MD Date:    12/07/2022 Time:    20:13       Assessment:       1. Elbow injury, left, initial encounter    2. Elbow sprain, left, initial encounter    3. Hematoma and contusion          On exam, patient is nontoxic appearing and vitals are stable.  Patient is essentially neurovascularly intact on exam.  Xrays showed no acute fracture or dislocation.  Diagnostic testing results were independently reviewed and interpreted, which were discussed in depth with patient.  Patient was treated with Ace wrap and recommended OTC treatments for their symptoms.  Offer prescription anti-inflammatory but she declined; will take OTC Aleve.  Patient was treated conservatively with activity modification, OTC pain reliever, muscle stretches, and RICE therapy. Patient was referred to orthopedics if she fails conservative treatment. If symptoms do not improve/worsens, patient was referred back to PCP for continued outpatient workup and management.     Patient was instructed to return for re-evaluation for any worsening or change in current symptoms. Strict ED versus clinic precautions given in depth. Discharge and follow-up instructions given verbally/printed with the  patient who expressed understanding and willingness to comply with my recommendations.  Patient verbalized understanding and agreed with the entirety of plan of care.    Note dictated with voice recognition software, please excuse any grammatical errors.      Plan:         Elbow injury, left, initial encounter  -     XR ELBOW COMPLETE 3 VIEW LEFT; Future; Expected date: 12/07/2022  -     Ambulatory referral/consult to Orthopedics    Elbow sprain, left, initial encounter  -     Ambulatory referral/consult to Orthopedics    Hematoma and contusion  -     Ambulatory referral/consult to Orthopedics            Additional MDM:     Heart Failure Score:   COPD = No    Patient Instructions   PLEASE READ YOUR DISCHARGE INSTRUCTIONS ENTIRELY AS IT CONTAINS IMPORTANT INFORMATION.  - OTC Tylenol/anti-inflammatory as needed for pain (see below). These medications can be obtained over the counter at any local pharmacy without requiring a prescription.   OTC ORAL medications for pain reliever or fever reducing:  - Acetaminophen (tylenol 650mg every 8 hour as needed with food) or Ibuprofen (advil,motrin 400-600mg every 8 hour with food as needed) as directed as needed for fever/pain. Avoid tylenol if you have a history of liver disease or allergic reactions. Do not take ibuprofen if you have a history of allergic reactions, stomach bleeding ulcers, kidney disease, or if you take blood thinners.  -The patient was advised that NSAID-type medications have two very important potential side effects: gastrointestinal irritation including hemorrhage and renal injuries. patient was asked to take the medication with food and to stop if patient experiences any GI upset. I asked patient to call for vomiting, abdominal pain or black/bloody stools. The patient expresses understanding of these issues and all questions were answered.    OTC TOPICAL meds for pain reliever :  -You can apply OTC diclofenac or Volatren Gel 2-3 times daily to muscle or  joints.  -You can also apply OTC lidocaine 4-5% with OR without menthol ointment 2-3 daily to muscle or joints.  -Please let one absorb before using the other. Do not use both at the same time as it may decrease efficacy. Please stop using if you develop any skin rash or irritation.  -Please wash your hands after application of these topical products.     - can continue OTC brace, splint or wrap as needed to help with your symptoms.  - continue heat (muscle) /ice (bone/joint) compression, rice therapy, and muscle stretches.   - Radiographs and all diagnostic testing reviewed with patient  - if no improvement or worsening symptoms, recommend follow-up with *orthopedics or PCP for further evaluation.  Please call the number below to set up appointment; if a referral has been placed.  - Follow up with your PCP or specialty clinic as directed.  You can call [phone] or [phone] to schedule an appointment with the appropriate provider.      -You must understand that you've received an Urgent Care treatment only and that you may be released before all your medical problems are known or treated. You, the patient, will arrange for follow up care as instructed. Please arrange follow up with your primary medical clinic within 2-5 days if your signs and symptoms have not resolved or worsen.     - If your condition worsens or fails to improve we recommend that you receive another evaluation at the emergency room immediately or contact your primary medical clinic to discuss your concerns in next 2-5 days.  Strict ER versus clinic precautions given.      RED FLAGS/WARNING SYMPTOMS DISCUSSED WITH PATIENT THAT WOULD WARRANT EMERGENT MEDICAL ATTENTION. Patient aware and verbalized understanding.      RICE     Rest an injury, elevate it, and use ice and compression as directed.   RICE stands for rest, ice, compression, and elevation. These can limit pain and swelling after an injury. RICE may be recommended to help treat  fractures, sprains, strains, and bruises or bumps.   Home care  The following explain the details of RICE:  Rest. Limit the use of the injured body part. This helps prevent further damage to the body part and gives it time to heal. In some cases, you may need a sling, brace, splint, or cast to help keep the body part still until it has healed.  Ice. Applying ice right after an injury helps relieve pain and swelling. Wrap a bag of ice in a thin towel. Then, place it over the injured area. Do this for 10 to 15 minutes every 3 to 4 hours. Continue for the next 1 to 3 days or until your symptoms improve. Never put ice directly on your skin or ice an area longer than 15 minutes at a time.  Compression. Putting pressure on an injury helps reduce swelling and provides support. Wrap the injured area firmly with an elastic bandage/wrap. Make sure not to wrap the bandage too tightly or you will cut off blood flow to the injured area. If your bandage loosens, rewrap it.  Elevation. Keeping an injury raised above the level of your heart reduces swelling, pain, and throbbing. For instance, if you have a broken leg, it may help to rest your leg on several pillows when sitting or lying down. Try to keep the injured area elevated for at least 2 to 3 hours per day.  Follow-up care  Follow up with your healthcare provider, or as advised.  When to seek medical advice  Call your healthcare provider right away if any of these occur:  Fever of 100.4°F (38°C) or higher, or as directed by your healthcare provider  Increased pain or swelling in the injured body part  Injured body part becomes cold, blue, numb, or tingly  Signs of infection. These include warmth in the skin, redness, drainage, or bad smell coming from the injured body part.  Date Last Reviewed: 1/18/2016  © 0802-2917 [email or web address]. [address] [place] [address], [place] [address]. All rights reserved. This information is not intended as a substitute for professional  medical care. Always follow your healthcare professional's instructions.

## 2023-01-18 ENCOUNTER — PATIENT OUTREACH (OUTPATIENT)
Dept: ADMINISTRATIVE | Facility: HOSPITAL | Age: 33
End: 2023-01-18
Payer: COMMERCIAL

## 2023-01-18 NOTE — PROGRESS NOTES
Health Maintenance Due   Topic Date Due    Hepatitis C Screening  Never done    Cervical Cancer Screening  Never done    HIV Screening  Never done    TETANUS VACCINE  05/07/2018    COVID-19 Vaccine (4 - Booster for Moderna series) 02/24/2022    Hemoglobin A1c (Prediabetes)  07/14/2022    Influenza Vaccine (1) Never done        Chart review done.   HM updated.   Immunizations reviewed & updated.   Care Everywhere updated.  [facility] reviewed   Orders Entered:  [facility]

## 2023-01-20 ENCOUNTER — OFFICE VISIT (OUTPATIENT)
Dept: ORTHOPEDICS | Facility: CLINIC | Age: 33
End: 2023-01-20
Payer: COMMERCIAL

## 2023-01-20 VITALS
DIASTOLIC BLOOD PRESSURE: 83 MMHG | SYSTOLIC BLOOD PRESSURE: 133 MMHG | HEART RATE: 89 BPM | BODY MASS INDEX: 38.57 KG/M2 | HEIGHT: 66 IN | WEIGHT: 240 LBS

## 2023-01-20 DIAGNOSIS — R20.0 BILATERAL FINGER NUMBNESS: Primary | ICD-10-CM

## 2023-01-20 PROCEDURE — 1160F PR REVIEW ALL MEDS BY PRESCRIBER/CLIN PHARMACIST DOCUMENTED: ICD-10-PCS | Mod: CPTII,S$GLB,, | Performed by: PHYSICIAN ASSISTANT

## 2023-01-20 PROCEDURE — 3079F PR MOST RECENT DIASTOLIC BLOOD PRESSURE 80-89 MM HG: ICD-10-PCS | Mod: CPTII,S$GLB,, | Performed by: PHYSICIAN ASSISTANT

## 2023-01-20 PROCEDURE — 3008F PR BODY MASS INDEX (BMI) DOCUMENTED: ICD-10-PCS | Mod: CPTII,S$GLB,, | Performed by: PHYSICIAN ASSISTANT

## 2023-01-20 PROCEDURE — 1159F PR MEDICATION LIST DOCUMENTED IN MEDICAL RECORD: ICD-10-PCS | Mod: CPTII,S$GLB,, | Performed by: PHYSICIAN ASSISTANT

## 2023-01-20 PROCEDURE — 1160F RVW MEDS BY RX/DR IN RCRD: CPT | Mod: CPTII,S$GLB,, | Performed by: PHYSICIAN ASSISTANT

## 2023-01-20 PROCEDURE — 3008F BODY MASS INDEX DOCD: CPT | Mod: CPTII,S$GLB,, | Performed by: PHYSICIAN ASSISTANT

## 2023-01-20 PROCEDURE — 99214 PR OFFICE/OUTPT VISIT, EST, LEVL IV, 30-39 MIN: ICD-10-PCS | Mod: S$GLB,,, | Performed by: PHYSICIAN ASSISTANT

## 2023-01-20 PROCEDURE — 99214 OFFICE O/P EST MOD 30 MIN: CPT | Mod: S$GLB,,, | Performed by: PHYSICIAN ASSISTANT

## 2023-01-20 PROCEDURE — 3075F SYST BP GE 130 - 139MM HG: CPT | Mod: CPTII,S$GLB,, | Performed by: PHYSICIAN ASSISTANT

## 2023-01-20 PROCEDURE — 99999 PR PBB SHADOW E&M-EST. PATIENT-LVL IV: CPT | Mod: PBBFAC,,, | Performed by: PHYSICIAN ASSISTANT

## 2023-01-20 PROCEDURE — 1159F MED LIST DOCD IN RCRD: CPT | Mod: CPTII,S$GLB,, | Performed by: PHYSICIAN ASSISTANT

## 2023-01-20 PROCEDURE — 3079F DIAST BP 80-89 MM HG: CPT | Mod: CPTII,S$GLB,, | Performed by: PHYSICIAN ASSISTANT

## 2023-01-20 PROCEDURE — 99999 PR PBB SHADOW E&M-EST. PATIENT-LVL IV: ICD-10-PCS | Mod: PBBFAC,,, | Performed by: PHYSICIAN ASSISTANT

## 2023-01-20 PROCEDURE — 3075F PR MOST RECENT SYSTOLIC BLOOD PRESS GE 130-139MM HG: ICD-10-PCS | Mod: CPTII,S$GLB,, | Performed by: PHYSICIAN ASSISTANT

## 2023-01-20 NOTE — PROGRESS NOTES
Subjective:      Patient ID: Steffanie Parkinson is a 32 y.o. female.    Chief Complaint: Pain and Numbness of the Right Hand      HPI  Steffanie Parkinson is a right hand dominant 32 y.o. female presenting today for evaluation of right hand tingling and discoloration.  She reports that this has occurred intermittently over the past 10+ years.  Hand numbness increases at night. She has occasional left hand symptoms.  She reports that the fingers will intermittently go blue, she is previously seen primary care for evaluation.  She was seen 1.5 months ago for left elbow pain status post fall while roller-skating.  She reports that her elbow pain is improving.       Review of patient's allergies indicates:  No Known Allergies      Current Outpatient Medications   Medication Sig Dispense Refill    ADDERALL XR 20 mg 24 hr capsule Take by mouth every morning.      FLUoxetine 40 MG capsule TAKE 1 CAPSULE BY MOUTH EVERY DAY 90 capsule 0    lisdexamfetamine (VYVANSE) 30 MG capsule Take 1 capsule (30 mg total) by mouth every morning. 1 capsule 0    ondansetron (ZOFRAN-ODT) 4 MG TbDL Take 1 tablet (4 mg total) by mouth every 8 (eight) hours as needed (Nausea). 20 tablet 0    TRI-LO-ESTARYLLA 0.18/0.215/0.25 mg-25 mcg tablet TAKE 1 TABLET BY MOUTH EVERY DAY 84 tablet 3    propranoloL (INDERAL) 10 MG tablet TAKE 1 TABLET BY MOUTH THREE TIMES A DAY AS NEEDED FOR ANXIETY (Patient not taking: Reported on 1/20/2023) 90 tablet 0     No current facility-administered medications for this visit.       Past Medical History:   Diagnosis Date    Migraine without aura and without status migrainosus, not intractable 12/2/2020    Migraines     Morbid obesity 12/2/2020       Past Surgical History:   Procedure Laterality Date    none           Review of Systems:  ROS:  Constitutional: no fever or chills  Skin: no rash or suspicious lesions  Musculoskeletal: See HPI.   Neurological: no headaches, lightheadedness, or dizziness. No numbness or  "tingling  Psychological/behavioral: no anxiety or depression      OBJECTIVE:     PHYSICAL EXAM:  Height: 5' 6" (167.6 cm) Weight: 108.9 kg (240 lb)  Vitals:    01/20/23 1030   BP: 133/83   Pulse: 89   Weight: 108.9 kg (240 lb)   Height: 5' 6" (1.676 m)   PainSc:   2     Vitals reviewed.  Constitutional: NAD. Patient appears well-developed and well-nourished.   HENT:   Head: Normocephalic and atraumatic.   Neck: Normal range of motion.   Cardiovascular: Normal rate.    Pulmonary/Chest: Effort normal. No respiratory distress.   Neurological: Patient is awake, alert, oriented.   Psychiatric: Patient has a normal mood and affect. Behavior is normal. Judgment and thought content normal.  Musculoskeletal:  No scars noted.  No lacerations or abrasions.  She is nontender to palpation, including nontender at the left elbow.  Good finger wrist and elbow range of motion bilaterally.  Neurovascularly intact-she is decreased median nerve sensation on the right compared to left, equal ulnar and radial sensation.  Good motor function bilaterally, good capillary refill, 2+ radial pulses.  Negative Tinel's bilaterally over the carpal tunnel.  Negative Durkan's bilaterally.        ASSESSMENT/PLAN:   Steffanie was seen today for pain and numbness.    Diagnoses and all orders for this visit:    Bilateral finger numbness  -     X-Ray Hand 3 View Bilateral; Future  -     EMG W/ ULTRASOUND AND NERVE CONDUCTION TEST 2 Extremities; Future           - We talked at length about the anatomy and pathophysiology of   Encounter Diagnosis   Name Primary?    Bilateral finger numbness Yes       - x-rays ordered  - discussed patient's symptoms and physical exam findings.  Patient will follow-up with primary care regarding concerns for intermittent finger tip circulation issues.  - discussed patient's symptoms of tingling and numbness.  We discussed nerve compression/carpal tunnel syndrome.  We discussed conservative treatment with nighttime bracing and " nerve glides.  We also discussed further evaluation with EMG since her provocative tests were normal but she does have decreased sensation in the median nerve distribution.  - follow-up after EMG  - call with questions or concerns    Disclaimer: This note has been generated using voice-recognition software. There may be typographical errors that have been missed during proof-reading.

## 2023-02-02 ENCOUNTER — TELEPHONE (OUTPATIENT)
Dept: PRIMARY CARE CLINIC | Facility: CLINIC | Age: 33
End: 2023-02-02
Payer: COMMERCIAL

## 2023-02-02 NOTE — TELEPHONE ENCOUNTER
----- Message from Shobha Phillips sent at 2/2/2023  3:01 PM CST -----  Contact: self/[phone]  Pt called in regard to having a temp 95.6/migraine/nauseas. pt would like the dr to put lab order into the system. call back    Please advise

## 2023-02-02 NOTE — TELEPHONE ENCOUNTER
Spoke with the pt, she reported waking up this morning feeling unwell. Report chills, fatigue, nausea, migraine, confusion, & diarrhea (chronic).  Temp this morning was 95.6. Pt advised she took a nap, warm bath and now her temp is 97.4 as of 45 minutes ago. She reports feeling better but not back to normal and still has a headache.     Pt does not feel UC or ER is presently needed. Discussed symptoms with Dr. Fishman who advised to monitor symptoms & be evaluated. Appt scheduled for tomorrow with Dr. Bynum. Advised to go to UC or ER if symptoms reoccurs or she begins to feel worse. Pt VU & agreed to plan.

## 2023-02-03 ENCOUNTER — OFFICE VISIT (OUTPATIENT)
Dept: PRIMARY CARE CLINIC | Facility: CLINIC | Age: 33
End: 2023-02-03
Payer: COMMERCIAL

## 2023-02-03 VITALS
BODY MASS INDEX: 43.22 KG/M2 | TEMPERATURE: 98 F | OXYGEN SATURATION: 99 % | RESPIRATION RATE: 18 BRPM | WEIGHT: 268.94 LBS | SYSTOLIC BLOOD PRESSURE: 124 MMHG | HEART RATE: 78 BPM | HEIGHT: 66 IN | DIASTOLIC BLOOD PRESSURE: 84 MMHG

## 2023-02-03 DIAGNOSIS — R53.83 MALAISE AND FATIGUE: ICD-10-CM

## 2023-02-03 DIAGNOSIS — R53.81 MALAISE AND FATIGUE: ICD-10-CM

## 2023-02-03 DIAGNOSIS — R41.89 BRAIN FOG: Primary | ICD-10-CM

## 2023-02-03 LAB
CTP QC/QA: YES
FLUAV AG NPH QL: NEGATIVE
FLUBV AG NPH QL: NEGATIVE

## 2023-02-03 PROCEDURE — 3079F DIAST BP 80-89 MM HG: CPT | Mod: CPTII,S$GLB,, | Performed by: STUDENT IN AN ORGANIZED HEALTH CARE EDUCATION/TRAINING PROGRAM

## 2023-02-03 PROCEDURE — 99999 PR PBB SHADOW E&M-EST. PATIENT-LVL III: ICD-10-PCS | Mod: PBBFAC,,, | Performed by: STUDENT IN AN ORGANIZED HEALTH CARE EDUCATION/TRAINING PROGRAM

## 2023-02-03 PROCEDURE — 3008F PR BODY MASS INDEX (BMI) DOCUMENTED: ICD-10-PCS | Mod: CPTII,S$GLB,, | Performed by: STUDENT IN AN ORGANIZED HEALTH CARE EDUCATION/TRAINING PROGRAM

## 2023-02-03 PROCEDURE — 99999 PR PBB SHADOW E&M-EST. PATIENT-LVL III: CPT | Mod: PBBFAC,,, | Performed by: STUDENT IN AN ORGANIZED HEALTH CARE EDUCATION/TRAINING PROGRAM

## 2023-02-03 PROCEDURE — 87804 INFLUENZA ASSAY W/OPTIC: CPT | Mod: QW,S$GLB,, | Performed by: STUDENT IN AN ORGANIZED HEALTH CARE EDUCATION/TRAINING PROGRAM

## 2023-02-03 PROCEDURE — 3008F BODY MASS INDEX DOCD: CPT | Mod: CPTII,S$GLB,, | Performed by: STUDENT IN AN ORGANIZED HEALTH CARE EDUCATION/TRAINING PROGRAM

## 2023-02-03 PROCEDURE — 87804 POCT INFLUENZA A/B: ICD-10-PCS | Mod: QW,S$GLB,, | Performed by: STUDENT IN AN ORGANIZED HEALTH CARE EDUCATION/TRAINING PROGRAM

## 2023-02-03 PROCEDURE — 1159F PR MEDICATION LIST DOCUMENTED IN MEDICAL RECORD: ICD-10-PCS | Mod: CPTII,S$GLB,, | Performed by: STUDENT IN AN ORGANIZED HEALTH CARE EDUCATION/TRAINING PROGRAM

## 2023-02-03 PROCEDURE — 3074F PR MOST RECENT SYSTOLIC BLOOD PRESSURE < 130 MM HG: ICD-10-PCS | Mod: CPTII,S$GLB,, | Performed by: STUDENT IN AN ORGANIZED HEALTH CARE EDUCATION/TRAINING PROGRAM

## 2023-02-03 PROCEDURE — 3074F SYST BP LT 130 MM HG: CPT | Mod: CPTII,S$GLB,, | Performed by: STUDENT IN AN ORGANIZED HEALTH CARE EDUCATION/TRAINING PROGRAM

## 2023-02-03 PROCEDURE — 99213 OFFICE O/P EST LOW 20 MIN: CPT | Mod: S$GLB,,, | Performed by: STUDENT IN AN ORGANIZED HEALTH CARE EDUCATION/TRAINING PROGRAM

## 2023-02-03 PROCEDURE — 99213 PR OFFICE/OUTPT VISIT, EST, LEVL III, 20-29 MIN: ICD-10-PCS | Mod: S$GLB,,, | Performed by: STUDENT IN AN ORGANIZED HEALTH CARE EDUCATION/TRAINING PROGRAM

## 2023-02-03 PROCEDURE — 3079F PR MOST RECENT DIASTOLIC BLOOD PRESSURE 80-89 MM HG: ICD-10-PCS | Mod: CPTII,S$GLB,, | Performed by: STUDENT IN AN ORGANIZED HEALTH CARE EDUCATION/TRAINING PROGRAM

## 2023-02-03 PROCEDURE — 1159F MED LIST DOCD IN RCRD: CPT | Mod: CPTII,S$GLB,, | Performed by: STUDENT IN AN ORGANIZED HEALTH CARE EDUCATION/TRAINING PROGRAM

## 2023-02-03 NOTE — PROGRESS NOTES
"Primary Care  Return/Acute Office Visit - In Person  2/3/2023  Mwengwe Ndhlovu      [facility]    Patient is a 32 y.o.   Steffanie Parkinson  has a past medical history of Migraine without aura and without status migrainosus, not intractable (12/2/2020), Migraines, and Morbid obesity (12/2/2020).    Patient presents with   Chief Complaint   Patient presents with    Headache    Fever     Patient reports one day history of chills, brain fog, headache, and home temp 95 F     She reports negative COVID testing     Social History     Social History Narrative    Not on file     Steffanie Parkinson family history includes Alcohol abuse in her maternal grandfather; Bipolar disorder in her maternal aunt; Drug abuse in her mother; Kidney disease in her mother; Nephrolithiasis in her maternal uncle; No Known Problems in her maternal grandmother.    Active Medications:  Review of patient's allergies indicates:  No Known Allergies  Current Outpatient Medications   Medication Instructions    ADDERALL XR 20 mg 24 hr capsule Oral, Every morning    FLUoxetine 40 MG capsule TAKE 1 CAPSULE BY MOUTH EVERY DAY    lisdexamfetamine (VYVANSE) 30 mg, Oral, Every morning    ondansetron (ZOFRAN-ODT) 4 mg, Oral, Every 8 hours PRN    propranoloL (INDERAL) 10 MG tablet TAKE 1 TABLET BY MOUTH THREE TIMES A DAY AS NEEDED FOR ANXIETY    TRI-LO-ESTARYLLA 0.18/0.215/0.25 mg-25 mcg tablet TAKE 1 TABLET BY MOUTH EVERY DAY       Review of Systems   Constitutional:  Positive for chills and fever.   Neurological:  Positive for headaches.   All other systems reviewed and are negative.    Vitals:    02/03/23 1353   BP: 124/84   BP Location: Right arm   Patient Position: Sitting   BP Method: Large (Manual)   Pulse: 78   Resp: 18   Temp: 97.9 °F (36.6 °C)   SpO2: 99%   Weight: 122 kg (268 lb 15.4 oz)   Height: 5' 6" (1.676 m)       Physical Exam  Vitals reviewed.   Constitutional:       General: She is not in acute distress.  HENT:      Right Ear: Tympanic membrane " normal.      Left Ear: Tympanic membrane normal.      Nose:      Right Sinus: No maxillary sinus tenderness or frontal sinus tenderness.      Left Sinus: No maxillary sinus tenderness or frontal sinus tenderness.      Mouth/Throat:      Pharynx: Oropharynx is clear. No pharyngeal swelling or oropharyngeal exudate.   Cardiovascular:      Rate and Rhythm: Normal rate and regular rhythm.      Pulses: Normal pulses.      Heart sounds: Normal heart sounds.   Pulmonary:      Effort: Pulmonary effort is normal.      Breath sounds: Normal breath sounds.   Abdominal:      General: Abdomen is flat. Bowel sounds are normal.      Palpations: Abdomen is soft.   Musculoskeletal:      Right lower leg: No edema.      Left lower leg: No edema.   Neurological:      General: No focal deficit present.      Mental Status: She is oriented to person, place, and time.        Assessment and Plan     General malaise   Likely prodrome of viral illness   COVID and influenza negative   Patient non toxic appearing and all vitals within normal range           Orders Placed This Visit  Orders Placed This Encounter   Procedures    POCT Influenza A/B           Upcoming Scheduled Appointments and Follow Up:    Future Appointments   Date Time Provider Department Center   2/27/2023  3:00 PM NEUROLOGY, [facility] CL[facility]IC [facility] NEURO [facility]   3/7/2023 10:00 AM [facility] XROP  [facility] XRAY OP [facility]   3/7/2023 11:00 AM CHRIS Tripp [facility] HAND [facility]   4/5/2023 11:00 AM Tara Will MD [facility]       Follow Up [facility] (with who? when?): No follow-ups on file.      Extended Emergency Contact Information  Primary Emergency Contact: AmilcarLuis Alberto  Mobile Phone: [phone]  Relation: Significant other  Preferred language: English   needed? No      Rita Bynum MD   Attending Physician  Primary Care  2/3/2023 - 2:13 PM    I spent a total of 15 minutes on the day of the visit.This includes face to  face time and non-face to face time preparing to see the patient (eg, review of tests), obtaining and/or reviewing separately obtained history, documenting clinical information in the electronic or other health record, independently interpreting results and communicating results to the patient/family/caregiver, or care coordinator.

## 2023-02-06 ENCOUNTER — OFFICE VISIT (OUTPATIENT)
Dept: URGENT CARE | Facility: CLINIC | Age: 33
End: 2023-02-06
Payer: COMMERCIAL

## 2023-02-06 VITALS
WEIGHT: 268 LBS | BODY MASS INDEX: 43.07 KG/M2 | OXYGEN SATURATION: 98 % | DIASTOLIC BLOOD PRESSURE: 84 MMHG | SYSTOLIC BLOOD PRESSURE: 134 MMHG | HEART RATE: 70 BPM | TEMPERATURE: 98 F | HEIGHT: 66 IN

## 2023-02-06 DIAGNOSIS — R68.83 CHILLS: ICD-10-CM

## 2023-02-06 DIAGNOSIS — R53.81 MALAISE AND FATIGUE: ICD-10-CM

## 2023-02-06 DIAGNOSIS — R11.0 NAUSEA: Primary | ICD-10-CM

## 2023-02-06 DIAGNOSIS — R53.83 MALAISE AND FATIGUE: ICD-10-CM

## 2023-02-06 LAB
B-HCG UR QL: NEGATIVE
BILIRUB UR QL STRIP: NEGATIVE
CTP QC/QA: YES
CTP QC/QA: YES
GLUCOSE SERPL-MCNC: 88 MG/DL (ref 70–110)
GLUCOSE UR QL STRIP: NEGATIVE
HGB, POC: 13.1 G/DL (ref 12–15)
KETONES UR QL STRIP: NEGATIVE
LEUKOCYTE ESTERASE UR QL STRIP: NEGATIVE
PH, POC UA: 5 (ref 5–8)
POC BLOOD, URINE: NEGATIVE
POC NITRATES, URINE: NEGATIVE
PROT UR QL STRIP: NEGATIVE
SARS-COV-2 AG RESP QL IA.RAPID: NEGATIVE
SP GR UR STRIP: 1.02 (ref 1–1.03)
UROBILINOGEN UR STRIP-ACNC: NORMAL (ref 0.1–1.1)

## 2023-02-06 PROCEDURE — 1160F PR REVIEW ALL MEDS BY PRESCRIBER/CLIN PHARMACIST DOCUMENTED: ICD-10-PCS | Mod: CPTII,S$GLB,, | Performed by: FAMILY MEDICINE

## 2023-02-06 PROCEDURE — 3079F PR MOST RECENT DIASTOLIC BLOOD PRESSURE 80-89 MM HG: ICD-10-PCS | Mod: CPTII,S$GLB,, | Performed by: FAMILY MEDICINE

## 2023-02-06 PROCEDURE — 1159F MED LIST DOCD IN RCRD: CPT | Mod: CPTII,S$GLB,, | Performed by: FAMILY MEDICINE

## 2023-02-06 PROCEDURE — 87811 SARS CORONAVIRUS 2 ANTIGEN POCT, MANUAL READ: ICD-10-PCS | Mod: QW,S$GLB,, | Performed by: FAMILY MEDICINE

## 2023-02-06 PROCEDURE — 1159F PR MEDICATION LIST DOCUMENTED IN MEDICAL RECORD: ICD-10-PCS | Mod: CPTII,S$GLB,, | Performed by: FAMILY MEDICINE

## 2023-02-06 PROCEDURE — 3075F PR MOST RECENT SYSTOLIC BLOOD PRESS GE 130-139MM HG: ICD-10-PCS | Mod: CPTII,S$GLB,, | Performed by: FAMILY MEDICINE

## 2023-02-06 PROCEDURE — 87811 SARS-COV-2 COVID19 W/OPTIC: CPT | Mod: QW,S$GLB,, | Performed by: FAMILY MEDICINE

## 2023-02-06 PROCEDURE — 1160F RVW MEDS BY RX/DR IN RCRD: CPT | Mod: CPTII,S$GLB,, | Performed by: FAMILY MEDICINE

## 2023-02-06 PROCEDURE — 81025 URINE PREGNANCY TEST: CPT | Mod: S$GLB,,, | Performed by: FAMILY MEDICINE

## 2023-02-06 PROCEDURE — 3008F BODY MASS INDEX DOCD: CPT | Mod: CPTII,S$GLB,, | Performed by: FAMILY MEDICINE

## 2023-02-06 PROCEDURE — 81025 POCT URINE PREGNANCY: ICD-10-PCS | Mod: S$GLB,,, | Performed by: FAMILY MEDICINE

## 2023-02-06 PROCEDURE — 81003 URINALYSIS AUTO W/O SCOPE: CPT | Mod: QW,S$GLB,, | Performed by: FAMILY MEDICINE

## 2023-02-06 PROCEDURE — 85018 POCT HEMOGLOBIN: ICD-10-PCS | Mod: QW,S$GLB,, | Performed by: FAMILY MEDICINE

## 2023-02-06 PROCEDURE — 99214 PR OFFICE/OUTPT VISIT, EST, LEVL IV, 30-39 MIN: ICD-10-PCS | Mod: S$GLB,,, | Performed by: FAMILY MEDICINE

## 2023-02-06 PROCEDURE — 85018 HEMOGLOBIN: CPT | Mod: QW,S$GLB,, | Performed by: FAMILY MEDICINE

## 2023-02-06 PROCEDURE — 82962 GLUCOSE BLOOD TEST: CPT | Mod: S$GLB,,, | Performed by: FAMILY MEDICINE

## 2023-02-06 PROCEDURE — 81003 POCT URINALYSIS, DIPSTICK, AUTOMATED, W/O SCOPE: ICD-10-PCS | Mod: QW,S$GLB,, | Performed by: FAMILY MEDICINE

## 2023-02-06 PROCEDURE — 99214 OFFICE O/P EST MOD 30 MIN: CPT | Mod: S$GLB,,, | Performed by: FAMILY MEDICINE

## 2023-02-06 PROCEDURE — 3079F DIAST BP 80-89 MM HG: CPT | Mod: CPTII,S$GLB,, | Performed by: FAMILY MEDICINE

## 2023-02-06 PROCEDURE — 82962 POCT GLUCOSE, HAND-HELD DEVICE: ICD-10-PCS | Mod: S$GLB,,, | Performed by: FAMILY MEDICINE

## 2023-02-06 PROCEDURE — 3075F SYST BP GE 130 - 139MM HG: CPT | Mod: CPTII,S$GLB,, | Performed by: FAMILY MEDICINE

## 2023-02-06 PROCEDURE — 3008F PR BODY MASS INDEX (BMI) DOCUMENTED: ICD-10-PCS | Mod: CPTII,S$GLB,, | Performed by: FAMILY MEDICINE

## 2023-02-06 RX ORDER — ONDANSETRON 4 MG/1
8 TABLET, ORALLY DISINTEGRATING ORAL EVERY 12 HOURS PRN
Qty: 20 TABLET | Refills: 0 | Status: SHIPPED | OUTPATIENT
Start: 2023-02-06

## 2023-02-06 NOTE — PROGRESS NOTES
Subjective:       Patient ID: Steffanie Parkinson is a 32 y.o. female.    Vitals:  vitals were not taken for this visit.     Chief Complaint: Nausea    Pt states that she is coming in for nausea and confusion and exhaustion pt says that she has chills and her temp dropped to 94.5   Pt  thank that she may have diabetes  Pt isnt in any pain   Pt self treated with tylenol yesterday    Syms started a week ago       Nausea  This is a new problem. The current episode started in the past 7 days. The problem occurs constantly. The problem has been unchanged. Associated symptoms include nausea. Nothing aggravates the symptoms. She has tried acetaminophen for the symptoms.     Gastrointestinal:  Positive for nausea.     Objective:      Physical Exam      Assessment:       No diagnosis found.      Plan:         There are no diagnoses linked to this encounter.

## 2023-02-06 NOTE — PROGRESS NOTES
"Subjective:       Patient ID: Steffanie Parkinson is a 32 y.o. female.    Vitals:  height is 5' 6" (1.676 m) and weight is 121.6 kg (268 lb). Her oral temperature is 98.1 °F (36.7 °C). Her blood pressure is 134/84 and her pulse is 70. Her oxygen saturation is 95%.     Chief Complaint: Nausea    Pt states that she is coming in for nausea and fogginess' and exhaustion pt says that she has chills and her temp dropped to 94.5 at some point this week.   Pt has hx of impaired fasting glucose so both she and her  are concerned she may have dm.   She denies any cough, chest pain, sinus congestions, HA, abd pain, swelling in the legs. Denies SOB.   Pt isnt in any pain   Pt self treated with tylenol yesterday    Symptoms started a week ago   She did see PCP 3 days ago for similar symptoms and  flu A/B test  was completed  and were negative. She was informed she would;d benefit from complete physical and was scheduled for one in 2-3 months per her report.   States she typically takes OCP for years and then b/c her and  had not been sexually active due to him being post op from spine surgery, she did come off OCP 2 months ago and has not had a menses yet. She states she has not had any intercourse since however and does not think she is pregnant. She had irregular menses prior to starting OCP.   She also weaned herself off fluoxetine around the same time 1-2 months ago-- she was taking for anxiety-- and states she was doing well until this week. She feels emotional this week but denies extreme worry or depressive thoughts. Does not endorse self harm or thoughts of harm to others    Nausea  This is a new problem. The current episode started in the past 7 days. The problem occurs constantly. The problem has been unchanged. Associated symptoms include nausea. Nothing aggravates the symptoms. She has tried acetaminophen for the symptoms.     Gastrointestinal:  Positive for nausea.   Skin:  Negative for erythema.   " "  Objective:    /84   Pulse 70   Temp 98.1 °F (36.7 °C) (Oral)   Ht 5' 6" (1.676 m)   Wt 121.6 kg (268 lb)   SpO2 95%   BMI 43.26 kg/m²     Physical Exam   Constitutional: She is oriented to person, place, and time.  Non-toxic appearance. She does not appear ill. No distress. obesity  HENT:   Head: Normocephalic and atraumatic.   Ears:   Right Ear: Tympanic membrane and external ear normal.   Left Ear: Tympanic membrane and external ear normal.   Nose: Nose normal.   Mouth/Throat: Mucous membranes are moist. Oropharynx is clear.   Eyes: Conjunctivae are normal. No scleral icterus.   Neck: No neck rigidity present.   Cardiovascular: Normal rate and regular rhythm.   No murmur heard.Exam reveals no gallop and no friction rub.   Pulmonary/Chest: No stridor. No respiratory distress. She has no wheezes. She has no rhonchi. She has no rales. She exhibits no tenderness.   Abdominal: She exhibits no distension. There is no abdominal tenderness. There is no guarding.   Musculoskeletal:         General: No swelling, tenderness or deformity.   Lymphadenopathy:     She has no cervical adenopathy.   Neurological: no focal deficit. She is alert and oriented to person, place, and time.   Skin: Skin is not diaphoretic and no rash. Capillary refill takes less than 2 seconds. No bruising and No erythema jaundice  Psychiatric: Her behavior is normal. Mood, judgment and thought content normal.         Recent Results (from the past 24 hour(s))   SARS Coronavirus 2 Antigen, POCT Manual Read    Collection Time: 02/06/23  5:25 PM   Result Value Ref Range    SARS Coronavirus 2 Antigen Negative Negative     Acceptable Yes    POCT HEMOGLOBIN    Collection Time: 02/06/23  5:50 PM   Result Value Ref Range    Hemoglobin 13.1 12.0 - 15.0 g/dL   POCT Glucose, Hand-Held Device    Collection Time: 02/06/23  5:50 PM   Result Value Ref Range    POC Glucose 88 70 - 110 MG/DL   POCT urine pregnancy    Collection Time: 02/06/23 "  6:00 PM   Result Value Ref Range    POC Preg Test, Ur Negative Negative     Acceptable Yes    POCT Urinalysis, Dipstick, Automated, W/O Scope    Collection Time: 02/06/23  6:00 PM   Result Value Ref Range    POC Blood, Urine Negative Negative    POC Bilirubin, Urine Negative Negative    POC Urobilinogen, Urine normal 0.1 - 1.1    POC Ketones, Urine Negative Negative    POC Protein, Urine Negative Negative    POC Nitrates, Urine Negative Negative    POC Glucose, Urine Negative Negative    pH, UA 5.0 5 - 8    POC Specific Gravity, Urine 1.020 1.003 - 1.029    POC Leukocytes, Urine Negative Negative      Assessment:       1. Nausea    2. Malaise and fatigue    3. Chills          Plan:         Nausea  -     SARS Coronavirus 2 Antigen, POCT Manual Read    Malaise and fatigue  -     POCT HEMOGLOBIN  -     POCT Glucose, Hand-Held Device  -     POCT urine pregnancy    Chills  -     POCT Urinalysis, Dipstick, Automated, W/O Scope     recommend rest and hydration     Follow up with PCP team with 3-5 days    Would likely benefit from more extensive evaluation for symptoms that may include full CBC, CMP, hbA1c, TSH, vit D, etc.     Pt informed so far, her laboratory studies and her VS within normal limits but she should still f/u in ED if experiencing any significant worsening or worrisome symptoms.       -----     Of note, prior to d/c, pts dark nailpolish was removed from one finger nail and her o2 sat quickly went up to 98% +

## 2023-02-06 NOTE — Clinical Note
Hi, I saw this very pleasant pt of yours in [facility], she has had fatigue, malaise, nausea this week. Completed rapid covid (flu completed 3 d ago in PCP group) as well as glucose and Hemoglobin finger sticks (WNL) and UA (wnl) and U preg (neg). She states she has f/u appt with pcp in April but I do recommend a closer f/u for a more complete w/u of pts symptoms if they persist, . Feel free to call or message me if you have any questions or concerns

## 2023-02-27 ENCOUNTER — PROCEDURE VISIT (OUTPATIENT)
Dept: NEUROLOGY | Facility: CLINIC | Age: 33
End: 2023-02-27
Payer: COMMERCIAL

## 2023-02-27 DIAGNOSIS — R20.0 BILATERAL FINGER NUMBNESS: ICD-10-CM

## 2023-03-06 ENCOUNTER — TELEPHONE (OUTPATIENT)
Dept: NEUROLOGY | Facility: CLINIC | Age: 33
End: 2023-03-06
Payer: COMMERCIAL

## 2023-03-13 ENCOUNTER — TELEPHONE (OUTPATIENT)
Dept: NEUROLOGY | Facility: CLINIC | Age: 33
End: 2023-03-13
Payer: COMMERCIAL

## 2023-03-14 ENCOUNTER — PROCEDURE VISIT (OUTPATIENT)
Dept: NEUROLOGY | Facility: CLINIC | Age: 33
End: 2023-03-14
Payer: COMMERCIAL

## 2023-03-14 ENCOUNTER — PATIENT MESSAGE (OUTPATIENT)
Dept: ORTHOPEDICS | Facility: CLINIC | Age: 33
End: 2023-03-14
Payer: COMMERCIAL

## 2023-03-14 DIAGNOSIS — R20.0 BILATERAL FINGER NUMBNESS: Primary | ICD-10-CM

## 2023-03-14 PROCEDURE — 95885 MUSC TST DONE W/NERV TST LIM: CPT | Mod: 59,S$GLB,, | Performed by: PHYSICAL MEDICINE & REHABILITATION

## 2023-03-14 PROCEDURE — 95886 PR EMG COMPLETE, W/ NERVE CONDUCTION STUDIES, 5+ MUSCLES: ICD-10-PCS | Mod: S$GLB,,, | Performed by: PHYSICAL MEDICINE & REHABILITATION

## 2023-03-14 PROCEDURE — 95911 NRV CNDJ TEST 9-10 STUDIES: CPT | Mod: S$GLB,,, | Performed by: PHYSICAL MEDICINE & REHABILITATION

## 2023-03-14 PROCEDURE — 95886 MUSC TEST DONE W/N TEST COMP: CPT | Mod: S$GLB,,, | Performed by: PHYSICAL MEDICINE & REHABILITATION

## 2023-03-14 PROCEDURE — 95885 PR MUSC TST DONE W/NERV TST LIM: ICD-10-PCS | Mod: 59,S$GLB,, | Performed by: PHYSICAL MEDICINE & REHABILITATION

## 2023-03-14 PROCEDURE — 95911 PR NERVE CONDUCTION STUDY; 9-10 STUDIES: ICD-10-PCS | Mod: S$GLB,,, | Performed by: PHYSICAL MEDICINE & REHABILITATION

## 2023-03-14 NOTE — PROCEDURES
Test Date:  3/14/2023    Patient: steffanie alcocer : 1990 Physician: Bao Hernandez D.O.   ID#: [id number] SEX: Female Ref. Phys: Eliza Slater PA     HPI: Steffanie Alcocer is a 32 y.o.female who presents for NCS/EMG to evaluate CTS bilaterally.      NCV & EMG Findings:  Evaluation of the left median motor nerve showed prolonged distal onset latency.  The right median motor nerve showed prolonged distal onset latency and decreased conduction velocity.  The left median sensory nerve showed prolonged distal peak latency and decreased conduction velocity.  The right median sensory nerve showed prolonged distal peak latency, reduced amplitude, and decreased conduction velocity.  All remaining nerves (as indicated in the following tables) were within normal limits.  Needle evaluation of the right Abductor Pollicis Brevis muscle showed increased insertional activity and slightly increased spontaneous activity.  All remaining muscles (as indicated in the following table) showed no evidence of electrical instability.    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 Carpal tunnel syndrome).  There is no motor axonal loss.  There is active denervation on the right.  This is graded as Severe in severity on the right, and Moderate on the left.          ___________________________  Bao Hernandez D.O.        NCS+  Motor Nerve Results      Latency Amplitude F-Lat Segment Distance CV Comment   Site (ms) Norm (mV) Norm (ms)  (cm) (m/s) Norm    Left Median (APB)   Wrist *6.3  < 4.4 8.0  > 5.9  Wrist-Palm - - -    Elbow 10.3 - 6.7 -  Elbow-Wrist 22 55  > 53    Right Median (APB)   Wrist *7.2  < 4.4 7.5  > 5.9  Wrist-Palm - - -    Elbow 11.1 - 6.6 -  Elbow-Wrist 20 *51  > 53    Left Ulnar (ADM)   Wrist 2.3  < 3.7 5.9  > 3.0         Bel Elbow 5.7 - 5.5 -  Bel Elbow-Wrist 24 71  > 52    Abv Elbow 7.3 - 5.2 -  Abv Elbow-Bel Elbow 11 69  >  43    Right Ulnar (ADM)   Wrist 2.2  < 3.7 6.4  > 3.0         Bel Elbow 5.5 - 6.4 -  Bel Elbow-Wrist 22 67  > 52    Abv Elbow 7.1 - 6.2 -  Abv Elbow-Bel Elbow 10 63  > 43      Sensory Nerve Results      Latency (Peak) Amplitude (P-P) Segment Distance CV Comment   Site (ms) Norm (µV) Norm  (cm) (m/s) Norm    Left Median   Wrist-Dig II *5.9  < 4.0 24  > 13 Wrist-Dig II 14 *24  > 39    Right Median   Wrist-Dig II *7.3  < 4.0 *5  > 13 Wrist-Dig II 14 *19  > 39    Left Ulnar   Wrist-Dig V 2.9  < 4.0 43  > 8 Wrist-Dig V 14 48  > 38    Right Ulnar   Wrist-Dig V 2.7  < 4.0 49  > 8 Wrist-Dig V 14 52  > 38    Right Radial   Forearm-Wrist 1.60  < 2.8 36  > 11 Forearm-Wrist 10 63 -      EMG+     Side Muscle Nerve Root Ins Act Fibs Psw Amp Dur Poly Recrt Int Pat Comment   Right Deltoid Axillary C5-C6 Nml Nml Nml Nml Nml 0 Nml Nml    Right Biceps Musculocut C5-C6 Nml Nml Nml Nml Nml 0 Nml Nml    Right Triceps Radial C6-C8 Nml Nml Nml Nml Nml 0 Nml Nml    Right Pronator Teres Median C6-C7 Nml Nml Nml Nml Nml 0 Nml Nml    Right APB Median C8-T1 *Incr *1+ *1+ Nml Nml 0 Nml Nml    Left APB Median C8-T1 Nml Nml Nml Nml Nml 0 Nml Nml            Waveforms:    Motor              Sensory

## 2023-03-23 ENCOUNTER — PATIENT OUTREACH (OUTPATIENT)
Dept: ADMINISTRATIVE | Facility: HOSPITAL | Age: 33
End: 2023-03-23
Payer: COMMERCIAL

## 2023-03-23 NOTE — PROGRESS NOTES
Health Maintenance Due   Topic Date Due    Hepatitis C Screening  Never done    Cervical Cancer Screening  Never done    HIV Screening  Never done    TETANUS VACCINE  05/07/2018    COVID-19 Vaccine (4 - Booster for Moderna series) 02/24/2022    Hemoglobin A1c (Prediabetes)  07/14/2022    Influenza Vaccine (1) Never done        Chart review done.   HM updated.   Immunizations reviewed & updated.   Care Everywhere updated.   [facility] reviewed

## 2023-03-27 ENCOUNTER — TELEPHONE (OUTPATIENT)
Dept: ORTHOPEDICS | Facility: CLINIC | Age: 33
End: 2023-03-27
Payer: COMMERCIAL

## 2023-03-27 NOTE — TELEPHONE ENCOUNTER
Called patient to reschedule her appt from April 4 to April 20th [facility]   Left message on answering machine to call back and schedule appointment.  Second call   Name band;

## 2023-03-29 ENCOUNTER — TELEPHONE (OUTPATIENT)
Dept: ORTHOPEDICS | Facility: CLINIC | Age: 33
End: 2023-03-29
Payer: COMMERCIAL

## 2023-03-29 NOTE — TELEPHONE ENCOUNTER
LVM for pt. Informing her of Cx EMG and need to r/s study and f/u appt with Dr. Champion. Requested a call back to the [facility] at [phone] with any questions, concerns or need for a different appointment time.

## 2023-04-03 ENCOUNTER — PATIENT MESSAGE (OUTPATIENT)
Dept: ADMINISTRATIVE | Facility: HOSPITAL | Age: 33
End: 2023-04-03
Payer: COMMERCIAL

## 2023-10-23 NOTE — PROGRESS NOTES
[facility] Note    Chief Complaint      Chief Complaint   Patient presents with    Anxiety    Menstrual Problem    Obesity       History of Present Illness      Steffanie Parkinson is a 33 y.o. female who presents today for   Chief Complaint   Patient presents with    Anxiety    Menstrual Problem    Obesity         Patient presents to clinic today to discuss ongoing anxiety, irregular menstrual cycles, and inability to lose weight.  Patient reports that she was taking fluoxetine but stopped taking the medication 4 months ago.  She is not been taking anything for anxiety since then.  She is not been taking her Vyvanse for ADD.  She reports gaining 40 lb in the last 2 years and has been unable to lose the weight.  She reports exercising by doing a dance class weekly.  She will need an appointment in a referral to see a gynecologist for regular menstrual cycles.  She will also need refill on her birth control pills.  Other wise patient is feeling well today.  There are no other complaints.         Review of Systems   Psychiatric/Behavioral:  The patient is nervous/anxious.    All 12 systems otherwise negative.       Family History:  family history includes Alcohol abuse in her maternal grandfather; Bipolar disorder in her maternal aunt; Drug abuse in her mother; Kidney disease in her mother; Nephrolithiasis in her maternal uncle; No Known Problems in her maternal grandmother.   Family history was reviewed with patient.     Medications:  Outpatient Encounter Medications as of 10/30/2023   Medication Sig Dispense Refill    ondansetron (ZOFRAN-ODT) 4 MG TbDL Take 2 tablets (8 mg total) by mouth every 12 (twelve) hours as needed (nausea, vomiting). 20 tablet 0    propranoloL (INDERAL) 10 MG tablet TAKE 1 TABLET BY MOUTH THREE TIMES A DAY AS NEEDED FOR ANXIETY 90 tablet 0    ADDERALL XR 20 mg 24 hr capsule Take by mouth every morning.      norgestimate-ethinyl estradioL (TRI-LO-ESTARYLLA) 0.18/0.215/0.25 mg-25  "mcg tablet Take 1 tablet by mouth once daily. 84 tablet 3    sertraline (ZOLOFT) 25 MG tablet Take 1 tablet (25 mg total) by mouth once daily. 30 tablet 5    [DISCONTINUED] FLUoxetine 40 MG capsule TAKE 1 CAPSULE BY MOUTH EVERY DAY (Patient not taking: Reported on 10/30/2023) 90 capsule 0    [DISCONTINUED] lisdexamfetamine (VYVANSE) 30 MG capsule Take 1 capsule (30 mg total) by mouth every morning. (Patient not taking: Reported on 2/6/2023) 1 capsule 0    [DISCONTINUED] TRI-LO-ESTARYLLA 0.18/0.215/0.25 mg-25 mcg tablet TAKE 1 TABLET BY MOUTH EVERY DAY (Patient not taking: Reported on 2/6/2023) 84 tablet 3     No facility-administered encounter medications on file as of 10/30/2023.       Allergies:  Review of patient's allergies indicates:  No Known Allergies    Health Maintenance:  Health Maintenance   Topic Date Due    Hepatitis C Screening  Never done    TETANUS VACCINE  05/07/2018    Lipid Panel  Completed     The patient has no Health Maintenance topics of status Not Due    Physical Exam      Vital Signs  Temp: 97.7 °F (36.5 °C)  Temp Source: Oral  Pulse: 67  Resp: 16  SpO2: 98 %  BP: 130/82  BP Location: Right arm  Patient Position: Sitting  Pain Score: 0-No pain  Height and Weight  Height: 5' 6" (167.6 cm)  Weight: 114.8 kg (253 lb 1.4 oz)  BSA (Calculated - sq m): 2.31 sq meters  BMI (Calculated): 40.9  Weight in (lb) to have BMI = 25: 154.6]    Physical Exam  Vitals reviewed.   Constitutional:       Appearance: Normal appearance. She is normal weight.   HENT:      Head: Normocephalic and atraumatic.      Nose: Nose normal.      Mouth/Throat:      Mouth: Mucous membranes are moist.      Pharynx: Oropharynx is clear.   Eyes:      Extraocular Movements: Extraocular movements intact.      Conjunctiva/sclera: Conjunctivae normal.      Pupils: Pupils are equal, round, and reactive to light.   Cardiovascular:      Rate and Rhythm: Normal rate and regular rhythm.      Pulses: Normal pulses.      Heart sounds: Normal " heart sounds.   Pulmonary:      Effort: Pulmonary effort is normal.      Breath sounds: Normal breath sounds.   Musculoskeletal:         General: Normal range of motion.      Cervical back: Normal range of motion and neck supple.   Skin:     General: Skin is warm and dry.      Capillary Refill: Capillary refill takes less than 2 seconds.   Neurological:      General: No focal deficit present.      Mental Status: She is alert and oriented to person, place, and time. Mental status is at baseline.   Psychiatric:         Mood and Affect: Mood normal.         Behavior: Behavior normal.         Thought Content: Thought content normal.         Judgment: Judgment normal.            Assessment/Plan     Steffanie Parkinson is a 33 y.o.female with:    Anxiety  -     sertraline (ZOLOFT) 25 MG tablet; Take 1 tablet (25 mg total) by mouth once daily.  Dispense: 30 tablet; Refill: 5    Encounter for contraceptive management, unspecified type  -     norgestimate-ethinyl estradioL (TRI-LO-ESTARYLLA) 0.18/0.215/0.25 mg-25 mcg tablet; Take 1 tablet by mouth once daily.  Dispense: 84 tablet; Refill: 3    Encounter for surveillance of contraceptive pills  -     norgestimate-ethinyl estradioL (TRI-LO-ESTARYLLA) 0.18/0.215/0.25 mg-25 mcg tablet; Take 1 tablet by mouth once daily.  Dispense: 84 tablet; Refill: 3    Irregular menstrual cycle  -     Ambulatory referral/consult to Gynecology; Future; Expected date: 11/06/2023  -     POCT Urine Pregnancy    Morbid obesity      I discussed diet of low calorie, low fat, low cholesterol, low carbohydrate, no fast foods diet. I have advised walking for 30 minutes for 2 days a week for 2 weeks and increasing a 30 minute day every 2 weeks. Patient verbalizes understanding.     - Make a follow up visit with primary care for your annual medical exam.    As above, continue current medications and maintain follow up with specialists.  Return to clinic as needed.    Greater than 50% of visit was spent face  to face with patient.  All questions were answered to patient's satisfaction.          Taylor Sahu, NP-C  [facility]

## 2023-10-30 ENCOUNTER — OFFICE VISIT (OUTPATIENT)
Dept: PRIMARY CARE CLINIC | Facility: CLINIC | Age: 33
End: 2023-10-30
Payer: COMMERCIAL

## 2023-10-30 VITALS
BODY MASS INDEX: 40.67 KG/M2 | TEMPERATURE: 98 F | RESPIRATION RATE: 16 BRPM | OXYGEN SATURATION: 98 % | HEIGHT: 66 IN | DIASTOLIC BLOOD PRESSURE: 82 MMHG | WEIGHT: 253.06 LBS | HEART RATE: 67 BPM | SYSTOLIC BLOOD PRESSURE: 130 MMHG

## 2023-10-30 DIAGNOSIS — N92.6 IRREGULAR MENSTRUAL CYCLE: ICD-10-CM

## 2023-10-30 DIAGNOSIS — Z30.41 ENCOUNTER FOR SURVEILLANCE OF CONTRACEPTIVE PILLS: ICD-10-CM

## 2023-10-30 DIAGNOSIS — F41.9 ANXIETY: Primary | ICD-10-CM

## 2023-10-30 DIAGNOSIS — Z30.9 ENCOUNTER FOR CONTRACEPTIVE MANAGEMENT, UNSPECIFIED TYPE: ICD-10-CM

## 2023-10-30 DIAGNOSIS — E66.01 CLASS 3 SEVERE OBESITY WITH BODY MASS INDEX (BMI) OF 40.0 TO 44.9 IN ADULT, UNSPECIFIED OBESITY TYPE, UNSPECIFIED WHETHER SERIOUS COMORBIDITY PRESENT: ICD-10-CM

## 2023-10-30 LAB
B-HCG UR QL: NEGATIVE
CTP QC/QA: YES

## 2023-10-30 PROCEDURE — 99999 PR PBB SHADOW E&M-EST. PATIENT-LVL V: ICD-10-PCS | Mod: PBBFAC,,, | Performed by: NURSE PRACTITIONER

## 2023-10-30 PROCEDURE — 99999 PR PBB SHADOW E&M-EST. PATIENT-LVL V: CPT | Mod: PBBFAC,,, | Performed by: NURSE PRACTITIONER

## 2023-10-30 PROCEDURE — 1160F PR REVIEW ALL MEDS BY PRESCRIBER/CLIN PHARMACIST DOCUMENTED: ICD-10-PCS | Mod: CPTII,S$GLB,, | Performed by: NURSE PRACTITIONER

## 2023-10-30 PROCEDURE — 81025 URINE PREGNANCY TEST: CPT | Mod: S$GLB,,, | Performed by: NURSE PRACTITIONER

## 2023-10-30 PROCEDURE — 1160F RVW MEDS BY RX/DR IN RCRD: CPT | Mod: CPTII,S$GLB,, | Performed by: NURSE PRACTITIONER

## 2023-10-30 PROCEDURE — 1159F PR MEDICATION LIST DOCUMENTED IN MEDICAL RECORD: ICD-10-PCS | Mod: CPTII,S$GLB,, | Performed by: NURSE PRACTITIONER

## 2023-10-30 PROCEDURE — 3075F SYST BP GE 130 - 139MM HG: CPT | Mod: CPTII,S$GLB,, | Performed by: NURSE PRACTITIONER

## 2023-10-30 PROCEDURE — 99214 OFFICE O/P EST MOD 30 MIN: CPT | Mod: S$GLB,,, | Performed by: NURSE PRACTITIONER

## 2023-10-30 PROCEDURE — 99214 PR OFFICE/OUTPT VISIT, EST, LEVL IV, 30-39 MIN: ICD-10-PCS | Mod: S$GLB,,, | Performed by: NURSE PRACTITIONER

## 2023-10-30 PROCEDURE — 3079F PR MOST RECENT DIASTOLIC BLOOD PRESSURE 80-89 MM HG: ICD-10-PCS | Mod: CPTII,S$GLB,, | Performed by: NURSE PRACTITIONER

## 2023-10-30 PROCEDURE — 3008F BODY MASS INDEX DOCD: CPT | Mod: CPTII,S$GLB,, | Performed by: NURSE PRACTITIONER

## 2023-10-30 PROCEDURE — 3008F PR BODY MASS INDEX (BMI) DOCUMENTED: ICD-10-PCS | Mod: CPTII,S$GLB,, | Performed by: NURSE PRACTITIONER

## 2023-10-30 PROCEDURE — 3079F DIAST BP 80-89 MM HG: CPT | Mod: CPTII,S$GLB,, | Performed by: NURSE PRACTITIONER

## 2023-10-30 PROCEDURE — 1159F MED LIST DOCD IN RCRD: CPT | Mod: CPTII,S$GLB,, | Performed by: NURSE PRACTITIONER

## 2023-10-30 PROCEDURE — 3075F PR MOST RECENT SYSTOLIC BLOOD PRESS GE 130-139MM HG: ICD-10-PCS | Mod: CPTII,S$GLB,, | Performed by: NURSE PRACTITIONER

## 2023-10-30 PROCEDURE — 81025 POCT URINE PREGNANCY: ICD-10-PCS | Mod: S$GLB,,, | Performed by: NURSE PRACTITIONER

## 2023-10-30 RX ORDER — SERTRALINE HYDROCHLORIDE 25 MG/1
25 TABLET, FILM COATED ORAL DAILY
Qty: 30 TABLET | Refills: 5 | Status: SHIPPED | OUTPATIENT
Start: 2023-10-30 | End: 2024-01-16

## 2023-10-30 RX ORDER — NORGESTIMATE AND ETHINYL ESTRADIOL 7DAYSX3 LO
1 KIT ORAL DAILY
Qty: 84 TABLET | Refills: 3 | Status: SHIPPED | OUTPATIENT
Start: 2023-10-30 | End: 2023-12-01 | Stop reason: SDUPTHER

## 2023-11-16 ENCOUNTER — LAB VISIT (OUTPATIENT)
Dept: LAB | Facility: HOSPITAL | Age: 33
End: 2023-11-16
Attending: OBSTETRICS & GYNECOLOGY
Payer: COMMERCIAL

## 2023-11-16 ENCOUNTER — OFFICE VISIT (OUTPATIENT)
Dept: OBSTETRICS AND GYNECOLOGY | Facility: CLINIC | Age: 33
End: 2023-11-16
Payer: COMMERCIAL

## 2023-11-16 VITALS — WEIGHT: 249.13 LBS | BODY MASS INDEX: 40.21 KG/M2 | DIASTOLIC BLOOD PRESSURE: 65 MMHG | SYSTOLIC BLOOD PRESSURE: 92 MMHG

## 2023-11-16 DIAGNOSIS — N91.1 SECONDARY AMENORRHEA: Primary | ICD-10-CM

## 2023-11-16 DIAGNOSIS — N92.6 IRREGULAR MENSTRUAL CYCLE: ICD-10-CM

## 2023-11-16 DIAGNOSIS — N91.1 SECONDARY AMENORRHEA: ICD-10-CM

## 2023-11-16 DIAGNOSIS — Z12.4 CERVICAL CANCER SCREENING: ICD-10-CM

## 2023-11-16 LAB
ESTRADIOL SERPL-MCNC: 34 PG/ML
FSH SERPL-ACNC: 5.98 MIU/ML
HCG INTACT+B SERPL-ACNC: <2.4 MIU/ML
PROLACTIN SERPL IA-MCNC: 7.1 NG/ML (ref 5.2–26.5)
T4 FREE SERPL-MCNC: 0.77 NG/DL (ref 0.71–1.51)
TSH SERPL DL<=0.005 MIU/L-ACNC: 2.81 UIU/ML (ref 0.4–4)

## 2023-11-16 PROCEDURE — 99203 OFFICE O/P NEW LOW 30 MIN: CPT | Mod: S$GLB,,, | Performed by: OBSTETRICS & GYNECOLOGY

## 2023-11-16 PROCEDURE — 88175 CYTOPATH C/V AUTO FLUID REDO: CPT | Performed by: OBSTETRICS & GYNECOLOGY

## 2023-11-16 PROCEDURE — 99999 PR PBB SHADOW E&M-EST. PATIENT-LVL III: CPT | Mod: PBBFAC,,, | Performed by: OBSTETRICS & GYNECOLOGY

## 2023-11-16 PROCEDURE — 99203 PR OFFICE/OUTPT VISIT, NEW, LEVL III, 30-44 MIN: ICD-10-PCS | Mod: S$GLB,,, | Performed by: OBSTETRICS & GYNECOLOGY

## 2023-11-16 PROCEDURE — 1160F RVW MEDS BY RX/DR IN RCRD: CPT | Mod: CPTII,S$GLB,, | Performed by: OBSTETRICS & GYNECOLOGY

## 2023-11-16 PROCEDURE — 87624 HPV HI-RISK TYP POOLED RSLT: CPT | Performed by: OBSTETRICS & GYNECOLOGY

## 2023-11-16 PROCEDURE — 84146 ASSAY OF PROLACTIN: CPT | Performed by: OBSTETRICS & GYNECOLOGY

## 2023-11-16 PROCEDURE — 84443 ASSAY THYROID STIM HORMONE: CPT | Performed by: OBSTETRICS & GYNECOLOGY

## 2023-11-16 PROCEDURE — 99999 PR PBB SHADOW E&M-EST. PATIENT-LVL III: ICD-10-PCS | Mod: PBBFAC,,, | Performed by: OBSTETRICS & GYNECOLOGY

## 2023-11-16 PROCEDURE — 1160F PR REVIEW ALL MEDS BY PRESCRIBER/CLIN PHARMACIST DOCUMENTED: ICD-10-PCS | Mod: CPTII,S$GLB,, | Performed by: OBSTETRICS & GYNECOLOGY

## 2023-11-16 PROCEDURE — 84439 ASSAY OF FREE THYROXINE: CPT | Performed by: OBSTETRICS & GYNECOLOGY

## 2023-11-16 PROCEDURE — 3078F PR MOST RECENT DIASTOLIC BLOOD PRESSURE < 80 MM HG: ICD-10-PCS | Mod: CPTII,S$GLB,, | Performed by: OBSTETRICS & GYNECOLOGY

## 2023-11-16 PROCEDURE — 36415 COLL VENOUS BLD VENIPUNCTURE: CPT | Mod: PN | Performed by: OBSTETRICS & GYNECOLOGY

## 2023-11-16 PROCEDURE — 3078F DIAST BP <80 MM HG: CPT | Mod: CPTII,S$GLB,, | Performed by: OBSTETRICS & GYNECOLOGY

## 2023-11-16 PROCEDURE — 1159F MED LIST DOCD IN RCRD: CPT | Mod: CPTII,S$GLB,, | Performed by: OBSTETRICS & GYNECOLOGY

## 2023-11-16 PROCEDURE — 3008F BODY MASS INDEX DOCD: CPT | Mod: CPTII,S$GLB,, | Performed by: OBSTETRICS & GYNECOLOGY

## 2023-11-16 PROCEDURE — 84702 CHORIONIC GONADOTROPIN TEST: CPT | Performed by: OBSTETRICS & GYNECOLOGY

## 2023-11-16 PROCEDURE — 82670 ASSAY OF TOTAL ESTRADIOL: CPT | Performed by: OBSTETRICS & GYNECOLOGY

## 2023-11-16 PROCEDURE — 3074F PR MOST RECENT SYSTOLIC BLOOD PRESSURE < 130 MM HG: ICD-10-PCS | Mod: CPTII,S$GLB,, | Performed by: OBSTETRICS & GYNECOLOGY

## 2023-11-16 PROCEDURE — 3074F SYST BP LT 130 MM HG: CPT | Mod: CPTII,S$GLB,, | Performed by: OBSTETRICS & GYNECOLOGY

## 2023-11-16 PROCEDURE — 83001 ASSAY OF GONADOTROPIN (FSH): CPT | Performed by: OBSTETRICS & GYNECOLOGY

## 2023-11-16 PROCEDURE — 1159F PR MEDICATION LIST DOCUMENTED IN MEDICAL RECORD: ICD-10-PCS | Mod: CPTII,S$GLB,, | Performed by: OBSTETRICS & GYNECOLOGY

## 2023-11-16 PROCEDURE — 3008F PR BODY MASS INDEX (BMI) DOCUMENTED: ICD-10-PCS | Mod: CPTII,S$GLB,, | Performed by: OBSTETRICS & GYNECOLOGY

## 2023-11-16 NOTE — PROGRESS NOTES
Chief Complaint   Patient presents with    Gynecologic Exam     Has not had period since July. Stopped taking birthcontrol in May.        HPI:   33 y.o.  here today for secondary amenorrhea. PMH includes obesity, ADHD, and anxiety on medications.     Stopped OCPs in  and hasn't had a period since then.  for 10 years,  had spinal surgery and hasn't been very sexually active. Trying to wean off of medications. Still experiencing cramping and nausea leading up to a cycle but not having any bleeding.     UPT negative at PCP appointment 10/30. PMH includes anxiety, migraines and obesity. Reports cycles were regular prior to being on contraceptives. Remembers cycles being mostly regular before starting pills, thinks they may have been every 35 days. Menarche was around age 13-14. Denies known history of thyroid disease, headaches, vision changes, or other concerns/complaints today.     LMP Dates from Last 1 Encounters:   LMP: 06/15/2023       Labs / Significant Studies  Pap: Needs, denies abnormal    Office Visit on 10/30/2023   Component Date Value Ref Range Status    POC Preg Test, Ur 10/30/2023 Negative  Negative Final     Acceptable 10/30/2023 Yes   Final        Past Medical History:   Diagnosis Date    Migraine without aura and without status migrainosus, not intractable 2020    Migraines     Morbid obesity 2020     Past Surgical History:   Procedure Laterality Date    none         Current Outpatient Medications:     ADDERALL XR 20 mg 24 hr capsule, Take by mouth every morning., Disp: , Rfl:     norgestimate-ethinyl estradioL (TRI-LO-ESTARYLLA) 0.18/0.215/0.25 mg-25 mcg tablet, Take 1 tablet by mouth once daily. (Patient not taking: Reported on 2023), Disp: 84 tablet, Rfl: 3    ondansetron (ZOFRAN-ODT) 4 MG TbDL, Take 2 tablets (8 mg total) by mouth every 12 (twelve) hours as needed (nausea, vomiting). (Patient not taking: Reported on 2023), Disp: 20  tablet, Rfl: 0    propranoloL (INDERAL) 10 MG tablet, TAKE 1 TABLET BY MOUTH THREE TIMES A DAY AS NEEDED FOR ANXIETY (Patient not taking: Reported on 2023), Disp: 90 tablet, Rfl: 0    sertraline (ZOLOFT) 25 MG tablet, Take 1 tablet (25 mg total) by mouth once daily. (Patient not taking: Reported on 2023), Disp: 30 tablet, Rfl: 5  Review of patient's allergies indicates:  No Known Allergies  OB History    Para Term  AB Living   0 0 0 0 0 0   SAB IAB Ectopic Multiple Live Births   0 0 0 0 0     Social History     Tobacco Use    Smoking status: Never    Smokeless tobacco: Never   Substance Use Topics    Alcohol use: Yes     Alcohol/week: 9.0 standard drinks of alcohol     Types: 9 Glasses of wine per week    Drug use: Never     Family History   Problem Relation Age of Onset    No Known Problems Maternal Grandmother     Alcohol abuse Maternal Grandfather     Drug abuse Mother     Kidney disease Mother     Bipolar disorder Maternal Aunt     Nephrolithiasis Maternal Uncle     Breast cancer Neg Hx     Colon cancer Neg Hx     Ovarian cancer Neg Hx        Review of Systems   Negative except as in HPI    Physical Exam   Vitals:    23 1004   BP: 92/65     Body mass index is 40.21 kg/m².    Physical Exam  Constitutional:       General: She is not in acute distress.     Appearance: Normal appearance.   Genitourinary:      Vulva, bladder and urethral meatus normal.      No vaginal discharge, erythema or bleeding.        Right Adnexa: not tender, not full and no mass present.     Left Adnexa: not tender, not full and no mass present.     No cervical motion tenderness or lesion.      Uterus is not tender.      No uterine mass detected.     Uterus is anteverted.      Bladder is not tender.       Pelvic exam was performed with patient in the lithotomy position.   HENT:      Head: Normocephalic and atraumatic.   Pulmonary:      Effort: Pulmonary effort is normal.   Abdominal:      General: Bowel sounds  are normal. There is no distension.      Palpations: Abdomen is soft.      Tenderness: There is no abdominal tenderness. There is no guarding or rebound.   Musculoskeletal:         General: Normal range of motion.      Cervical back: Normal range of motion.   Neurological:      General: No focal deficit present.      Mental Status: She is alert and oriented to person, place, and time.   Skin:     General: Skin is warm and dry.   Psychiatric:         Mood and Affect: Mood normal.         Behavior: Behavior normal.         Thought Content: Thought content normal.        Labs reviewed: UPT, CBC, CMP, TSH    ASSESSMENT:   Patient Active Problem List   Diagnosis    Generalized anxiety disorder    Giardiasis    Morbid obesity    Migraine without aura and without status migrainosus, not intractable    IFG (impaired fasting glucose)    Paresthesias    Blurred vision, bilateral    Hematemesis with nausea    Attention deficit hyperactivity disorder (ADHD)    Secondary amenorrhea       PLAN:  Problem List Items Addressed This Visit          Renal/    Secondary amenorrhea - Primary    Current Assessment & Plan     Labs ordered today, pap obtained as patient was due. RTC 2 weeks for virtual visit, if no lab abnormalities, suspect hormone imbalance/possible PCOS as underlying etiology. Can discuss future treatment options based on lab findings.          Relevant Orders    Follicle Stimulating Hormone (Completed)    Estradiol (Completed)    TSH (Completed)    Prolactin (Completed)    T4, FREE (Completed)    HCG, Quantitative (Completed)     Other Visit Diagnoses       Cervical cancer screening        Relevant Orders    Liquid-Based Pap Smear, Screening (Completed)    HPV High Risk Genotypes, PCR (Completed)             Total time spent on this encounter was 20 minutes.  This includes preparing to see the patient;  obtaining/reviewing separately obtained history;  performing a medical exam and/or evaluation;    counseling/educating the patient;  ordering medications, tests, of procedures;   referring/communicating with other health care professionals;  EMR documentation;  interpreting/communicating results to the patient;  and/or care coordination.    Follow up in 2 weeks (on 11/30/2023) for Virtual Visit/Results.       Betty Gandhi MD  Department of Obstetrics & Gynecology  [facility]

## 2023-11-21 LAB
HPV HR 12 DNA SPEC QL NAA+PROBE: NEGATIVE
HPV16 AG SPEC QL: NEGATIVE
HPV18 DNA SPEC QL NAA+PROBE: NEGATIVE

## 2023-11-27 LAB
FINAL PATHOLOGIC DIAGNOSIS: NORMAL
Lab: NORMAL

## 2023-11-28 NOTE — ASSESSMENT & PLAN NOTE
Labs ordered today, pap obtained as patient was due. RTC 2 weeks for virtual visit, if no lab abnormalities, suspect hormone imbalance/possible PCOS as underlying etiology. Can discuss future treatment options based on lab findings.

## 2023-12-01 ENCOUNTER — OFFICE VISIT (OUTPATIENT)
Dept: OBSTETRICS AND GYNECOLOGY | Facility: CLINIC | Age: 33
End: 2023-12-01
Payer: COMMERCIAL

## 2023-12-01 DIAGNOSIS — N91.1 SECONDARY AMENORRHEA: ICD-10-CM

## 2023-12-01 DIAGNOSIS — Z30.41 ENCOUNTER FOR SURVEILLANCE OF CONTRACEPTIVE PILLS: ICD-10-CM

## 2023-12-01 DIAGNOSIS — Z30.9 ENCOUNTER FOR CONTRACEPTIVE MANAGEMENT, UNSPECIFIED TYPE: ICD-10-CM

## 2023-12-01 DIAGNOSIS — N91.4 SECONDARY OLIGOMENORRHEA: Primary | ICD-10-CM

## 2023-12-01 PROCEDURE — 99214 PR OFFICE/OUTPT VISIT, EST, LEVL IV, 30-39 MIN: ICD-10-PCS | Mod: 95,,, | Performed by: OBSTETRICS & GYNECOLOGY

## 2023-12-01 PROCEDURE — 99214 OFFICE O/P EST MOD 30 MIN: CPT | Mod: 95,,, | Performed by: OBSTETRICS & GYNECOLOGY

## 2023-12-01 RX ORDER — NORGESTIMATE AND ETHINYL ESTRADIOL 7DAYSX3 LO
1 KIT ORAL DAILY
Qty: 84 TABLET | Refills: 3 | Status: SHIPPED | OUTPATIENT
Start: 2023-12-01

## 2023-12-01 NOTE — PROGRESS NOTES
The patient location is: [facility]  The chief complaint leading to consultation is: Results  Visit type: audiovisual  Total time spent with patient: 10 minutes    Each patient to whom he or she provides medical services by telemedicine is:  (1) informed of the relationship between the physician and patient and the respective role of any other health care provider with respect to management of the patient; and (2) notified that he or she may decline to receive medical services by telemedicine and may withdraw from such care at any time.    Chief Complaint: Results     HPI:      Steffanie Parkinson is a 33 y.o.  who presents via virtual visit to discuss results of secondary amenorrhea workup. Was seen for annual visit and reported had been off of OCPs since  and has not had any vaginal bleeding since then. Denies associated symptoms or other concerns. Has never been told she has PCOS.       ROS:     GENERAL: Denies fevers or chills. Feeling well overall.   ABDOMEN: Denies abdominal pain, constipation, diarrhea, nausea, vomiting, change in appetite.   URINARY: Denies frequency, dysuria, hematuria.  GYNECOLOGIC: See HPI.  NEUROLOGIC: Denies syncope or weakness.     Physical Exam:      PHYSICAL EXAM:  There were no vitals taken for this visit.  There is no height or weight on file to calculate BMI.     APPEARANCE: Well nourished, well developed, in no acute distress.  Exam deferred due to televisit    Results:      FSH: 5.98  E2: 34  TSH: 2.813  T4: 0.77  hCG: <2.4  Prolactin: 7.1  Pap (2023): NILM/HPV neg    Assessment/Plan:     Problem List Items Addressed This Visit          Renal/    Secondary amenorrhea    Current Assessment & Plan     Negative lab workup. Likely PCOS spectrum. Wishes to resume OCPs. Counseling provided. No absolute contraindications.    Desires OCPs. No absolute contraindications.   Recommend setting a timer on her phone to remind patient to take pills around the same time every  day. Breakthrough bleeding is common in the first few months after starting pills. If sexually active, use a backup method like condoms or abstain for the first 7 days after starting OCPs to prevent unintended pregnancy. Call the office or contact us if more than two pills in a row are missed, counseled on need for backup method and possibility of irregular bleeding and pregnancy with missed pills. Patient verbalized understanding and is amenable to the plan.           Secondary oligomenorrhea - Primary    Relevant Medications    norgestimate-ethinyl estradioL (TRI-LO-ESTARYLLA) 0.18/0.215/0.25 mg-25 mcg tablet     Other Visit Diagnoses       Encounter for contraceptive management, unspecified type        Relevant Medications    norgestimate-ethinyl estradioL (TRI-LO-ESTARYLLA) 0.18/0.215/0.25 mg-25 mcg tablet    Encounter for surveillance of contraceptive pills                Counseling:       Use of the [employer] Patient Portal discussed and encouraged during today's visit.       Betty Gandhi MD  Department of Obstetrics & Gynecology  [facility]

## 2023-12-01 NOTE — ASSESSMENT & PLAN NOTE
Negative lab workup. Likely PCOS spectrum. Wishes to resume OCPs. Counseling provided. No absolute contraindications.    Desires OCPs. No absolute contraindications.   Recommend setting a timer on her phone to remind patient to take pills around the same time every day. Breakthrough bleeding is common in the first few months after starting pills. If sexually active, use a backup method like condoms or abstain for the first 7 days after starting OCPs to prevent unintended pregnancy. Call the office or contact us if more than two pills in a row are missed, counseled on need for backup method and possibility of irregular bleeding and pregnancy with missed pills. Patient verbalized understanding and is amenable to the plan.

## 2024-01-15 NOTE — PROGRESS NOTES
[facility] Note    Chief Complaint      Chief Complaint   Patient presents with    Annual Exam       History of Present Illness      Steffanie Parkinson is a 33 y.o. WF with MERCEDES, Morbid Obesity, Kenney carpal Tunnel syndand migraines without aura presents to fu well visit.  Last visit -12/7/21    Kenney carpal tunnel synd - EMG - 3/14/23 - evidence of a bilateral sensorimotor median mononeuropathy across the wrist (I.e. Carpal tunnel syndrome).  There is no motor axonal loss.  There is active denervation on the right.  This is graded as Severe in severity on the right, and Moderate on the left.  Cont wrist splint prn.    Feet numbness - Resolved. Recent TSH - wnl. Trigger - stress. No weakness. Ha1c - 5.2 - wnl. Vitamin B1 was normal. B12 was borderline low - will try OTC suppl.    Secondary Amenorrhea - Fu by OB.  Suspected poss PCOS. Pt on OCP.         Bruxism - Rec Can discuss a mouthguard with her dentist.      Leukocytosis - WBC - 14 - Has been up and down (9.8-14).  Will cont to monitor. Repeat CBC.      Vit B 12 def - Her Vitamin B12 was borderline low at 340 and your folic acid was normal at 5.5.  Rec OTC vitamin B12 1000 mcg/d and see if this helps with symptoms.       Raynaud's phenomenon - Hands can turn blue in cold.  She was told she has Raynaud's in past when she was living in [place] and it was colder.  ANN-MARIE (screening test of Connective tissue/autoimmune diseases such as Lupus) was neg.      MERCEDES - Pt prev on Lexapro 10 mg/d but ran out.  She did gain wt unsure if related to her Lexapro. Pt then tried  Fluoxetine. She has taken Propranolol 10 mg prn and it has helped slightly. She has Rx to take 2 Propranolol if needed for anxiety.  She may do so and should call with any concerns or if Anxiety is worsening.   She got tested for ADHD- Hyperactivity predominant. She reports a therapist was recommended at the facility where she had her ADHD testing. She has been journaling and plans to time herself  for certain activitises.  She is in group therapy. - Pt off sertraline 25 mg/d.         ADHD - Pt now off Vyvanse 30 mg/d per Psych.  Caused her to feel like she was mean. She  tried Adderall XR 20 mg.  She prefers not to take pharmacotherapy.       Obesity - BMI - 39.6 - Down 8 lb since Oct.       Migraine - Occur around cycles.  + Photosensitivity.  No Aura. Warm packs prn help. 2/month. + assoc nausea.       HCM - Flu - none - defers;  Tdap - admin 1/16/24; Gardasil - none;  COVID 19 Vaccine #1 - 3/18/21; #2 4/15/21; # 3 - 12/30/21; # 4 ;  MGM - none? - due at 40;  PAP -11/16/23 - neg.;  Hep C Screen - none- declines;  HIV Screen - none - declines; C-scope - none- due at 45 y.o.;   Prev PCP - Dr. Wood; Ob/GYN - ; Optometry - Jessica Hernandez; Well visit - 1/13/21    Patient Care Team:  Tara Will MD as PCP - General (Internal Medicine)  Thalia Elder RD as Dietitian (Nutrition)  Little Montilla MA as Care Coordinator     Health Maintenance:  Immunization History   Administered Date(s) Administered    COVID-19, MRNA, LN-S, PF (MODERNA FULL 0.5 ML DOSE) 03/18/2021, 04/15/2021    COVID-19, MRNA, LN-S, PF (Pfizer) (Purple Cap) 12/30/2021    MMR 06/27/1991, 10/28/1998    Meningococcal Conjugate (MCV4P) 06/28/2013    Td (ADULT) 05/07/2008    Td - PF (ADULT) 05/07/2008      Health Maintenance   Topic Date Due    Hepatitis C Screening  Never done    TETANUS VACCINE  05/07/2018    Lipid Panel  Completed        Past Medical History:  Past Medical History:   Diagnosis Date    Migraine without aura and without status migrainosus, not intractable 12/2/2020    Migraines     Morbid obesity 12/2/2020       Past Surgical History:   has a past surgical history that includes none.    Family History:  family history includes Alcohol abuse in her maternal grandfather; Bipolar disorder in her maternal aunt; Drug abuse in her mother; Kidney disease in her mother; Nephrolithiasis in her maternal uncle; No Known  Problems in her maternal grandmother.     Social History:  Social History     Tobacco Use    Smoking status: Never    Smokeless tobacco: Never   Substance Use Topics    Alcohol use: Yes     Alcohol/week: 1.0 standard drink of alcohol     Types: 1 Glasses of wine per week     Comment: 1 glass most days    Drug use: Never       Review of Systems   Constitutional:  Negative for chills, diaphoresis and fever.   HENT:  Negative for hearing loss.    Eyes:  Negative for visual disturbance.   Respiratory:  Negative for chest tightness and shortness of breath.    Cardiovascular:  Negative for chest pain and palpitations.   Gastrointestinal:  Positive for nausea. Negative for abdominal pain, constipation, diarrhea and vomiting.        With HA   Endocrine: Negative for cold intolerance, heat intolerance and polydipsia.   Genitourinary:  Negative for bladder incontinence, dysuria and frequency.   Musculoskeletal:  Negative for arthralgias and myalgias.   Neurological:  Positive for headaches.   Psychiatric/Behavioral:  Positive for dysphoric mood. The patient is nervous/anxious.         Depression and anxiety doing a little better.         Medications:    Current Outpatient Medications:     norgestimate-ethinyl estradioL (TRI-LO-ESTARYLLA) 0.18/0.215/0.25 mg-25 mcg tablet, Take 1 tablet by mouth once daily., Disp: 84 tablet, Rfl: 3    ondansetron (ZOFRAN-ODT) 4 MG TbDL, Take 2 tablets (8 mg total) by mouth every 12 (twelve) hours as needed (nausea, vomiting)., Disp: 20 tablet, Rfl: 0    diphth,pertus,acell,,tetanus (BOOSTRIX TDAP) 2.5-8-5 Lf-mcg-Lf/0.5mL Syrg injection, Inject into the muscle., Disp: 0.5 mL, Rfl: 0    propranoloL (INDERAL) 10 MG tablet, 1 po daily prn anxiety, Disp: 90 tablet, Rfl: 0     Allergies:  Review of patient's allergies indicates:  No Known Allergies    Physical Exam      Vital Signs  Temp: 97.7 °F (36.5 °C)  Temp Source: Oral  Pulse: 62  Resp: 16  SpO2: 97 %  BP: 104/66  BP Location: Left arm  Patient  "Position: Sitting  Pain Score: 0-No pain  Height and Weight  Height: 5' 6" (167.6 cm)  Weight: 111.3 kg (245 lb 6 oz)  BSA (Calculated - sq m): 2.28 sq meters  BMI (Calculated): 39.6  Weight in (lb) to have BMI = 25: 154.6      Patient Position: Sitting      Physical Exam  Vitals reviewed.   Constitutional:       General: She is not in acute distress.     Appearance: Normal appearance. She is obese. She is not ill-appearing, toxic-appearing or diaphoretic.   HENT:      Head: Normocephalic and atraumatic.      Right Ear: Tympanic membrane normal.      Left Ear: Tympanic membrane normal.      Mouth/Throat:      Mouth: Mucous membranes are moist.      Pharynx: No posterior oropharyngeal erythema.   Eyes:      Extraocular Movements: Extraocular movements intact.      Conjunctiva/sclera: Conjunctivae normal.      Pupils: Pupils are equal, round, and reactive to light.   Neck:      Vascular: No carotid bruit.   Cardiovascular:      Rate and Rhythm: Normal rate and regular rhythm.      Pulses: Normal pulses.      Heart sounds: Normal heart sounds.   Pulmonary:      Effort: Pulmonary effort is normal. No respiratory distress.      Breath sounds: Normal breath sounds.   Abdominal:      General: Bowel sounds are normal. There is no distension.      Palpations: Abdomen is soft.      Tenderness: There is no abdominal tenderness. There is no guarding or rebound.   Musculoskeletal:      Cervical back: Neck supple. No tenderness.   Neurological:      General: No focal deficit present.      Mental Status: She is alert and oriented to person, place, and time.   Psychiatric:         Mood and Affect: Mood normal.         Behavior: Behavior normal.          Laboratory:  CBC:  Recent Labs   Lab 07/14/21  0959 10/16/21  1336 02/06/23  1750   WBC 9.86 14.06 H  --    RBC 4.64 4.69  --    Hemoglobin 13.1 13.3 13.1   Hematocrit 41.0 40.8  --    Platelets 286 308  --    MCV 88 87  --    MCH 28.2 28.4  --    MCHC 32.0 32.6  --  "       CMP:  Recent Labs   Lab 07/14/21  0959 10/16/21  1336   Glucose 93 89   Calcium 9.4 9.2   Albumin 3.4 L 3.4 L   Total Protein 6.8 7.0   Sodium 137 137   Potassium 4.8 4.2   CO2 21 L 22 L   Chloride 105 103   BUN 19 16   Creatinine 0.7 0.7   Alkaline Phosphatase 34 L 31 L   ALT 25 22   AST 25 23   Total Bilirubin 0.3 0.5           URINALYSIS:  Recent Labs   Lab 02/06/23  1800   pH, UA 5.0        LIPIDS:  Recent Labs   Lab 07/14/21  0959 11/16/23  1055   TSH 3.684 2.813   HDL 58  --    Cholesterol 196  --    Triglycerides 148  --    LDL Cholesterol 108.4  --    HDL/Cholesterol Ratio 29.6  --    Non-HDL Cholesterol 138  --    Total Cholesterol/HDL Ratio 3.4  --        TSH:  Recent Labs   Lab 07/14/21  0959 11/16/23  1055   TSH 3.684 2.813       A1C:  Recent Labs   Lab 07/14/21  0959   Hemoglobin A1C 5.2          Other:   Recent Labs   Lab 07/16/21  1115 11/16/23  1055   Follicle Stimulating Hormone  --  5.98   Estradiol  --  34   Vitamin B-12 340  --            Assessment/Plan     Steffanie Parkinson is a 33 y.o.female with:    Normal physical exam, routine  -     CBC Auto Differential; Future; Expected date: 01/16/2024  -     Comprehensive Metabolic Panel; Future; Expected date: 01/16/2024  -     Hemoglobin A1C; Future; Expected date: 01/16/2024  - Performed today.  Will check Basic labs.  RTC in 1 yr for fu or sooner if needed    Generalized anxiety disorder  -     propranoloL (INDERAL) 10 MG tablet; 1 po daily prn anxiety  Dispense: 90 tablet; Refill: 0  - Controlled.  Cont current.     Bilateral carpal tunnel syndrome  - Stable.  Cont current regimen.    Bruxism  - Rec mouthguard.     Class 2 obesity with body mass index (BMI) of 39.0 to 39.9 in adult, unspecified obesity type, unspecified whether serious comorbidity present  - Cont diet and exercise for wt loss.     Screening for lipoid disorders  -     Lipid Panel; Future; Expected date: 01/16/2024         Chronic conditions status updated as per HPI.  Other  than changes above, cont current medications and maintain follow up with specialists.  Follow up in about 1 year (around 1/16/2025).      Tara Will MD  [facility]

## 2024-01-16 ENCOUNTER — OFFICE VISIT (OUTPATIENT)
Dept: PRIMARY CARE CLINIC | Facility: CLINIC | Age: 34
End: 2024-01-16
Payer: COMMERCIAL

## 2024-01-16 VITALS
BODY MASS INDEX: 39.43 KG/M2 | SYSTOLIC BLOOD PRESSURE: 104 MMHG | OXYGEN SATURATION: 97 % | WEIGHT: 245.38 LBS | HEART RATE: 62 BPM | TEMPERATURE: 98 F | RESPIRATION RATE: 16 BRPM | HEIGHT: 66 IN | DIASTOLIC BLOOD PRESSURE: 66 MMHG

## 2024-01-16 DIAGNOSIS — F45.8 BRUXISM: ICD-10-CM

## 2024-01-16 DIAGNOSIS — E66.9 CLASS 2 OBESITY WITH BODY MASS INDEX (BMI) OF 39.0 TO 39.9 IN ADULT, UNSPECIFIED OBESITY TYPE, UNSPECIFIED WHETHER SERIOUS COMORBIDITY PRESENT: ICD-10-CM

## 2024-01-16 DIAGNOSIS — G56.03 BILATERAL CARPAL TUNNEL SYNDROME: ICD-10-CM

## 2024-01-16 DIAGNOSIS — F41.1 GENERALIZED ANXIETY DISORDER: ICD-10-CM

## 2024-01-16 DIAGNOSIS — Z00.00 NORMAL PHYSICAL EXAM, ROUTINE: Primary | ICD-10-CM

## 2024-01-16 DIAGNOSIS — Z13.220 SCREENING FOR LIPOID DISORDERS: ICD-10-CM

## 2024-01-16 PROCEDURE — 1159F MED LIST DOCD IN RCRD: CPT | Mod: CPTII,S$GLB,, | Performed by: INTERNAL MEDICINE

## 2024-01-16 PROCEDURE — 1160F RVW MEDS BY RX/DR IN RCRD: CPT | Mod: CPTII,S$GLB,, | Performed by: INTERNAL MEDICINE

## 2024-01-16 PROCEDURE — 3008F BODY MASS INDEX DOCD: CPT | Mod: CPTII,S$GLB,, | Performed by: INTERNAL MEDICINE

## 2024-01-16 PROCEDURE — 99999 PR PBB SHADOW E&M-EST. PATIENT-LVL IV: CPT | Mod: PBBFAC,,, | Performed by: INTERNAL MEDICINE

## 2024-01-16 PROCEDURE — 3074F SYST BP LT 130 MM HG: CPT | Mod: CPTII,S$GLB,, | Performed by: INTERNAL MEDICINE

## 2024-01-16 PROCEDURE — 3078F DIAST BP <80 MM HG: CPT | Mod: CPTII,S$GLB,, | Performed by: INTERNAL MEDICINE

## 2024-01-16 PROCEDURE — 99395 PREV VISIT EST AGE 18-39: CPT | Mod: S$GLB,,, | Performed by: INTERNAL MEDICINE

## 2024-01-16 RX ORDER — PROPRANOLOL HYDROCHLORIDE 10 MG/1
TABLET ORAL
Qty: 90 TABLET | Refills: 0 | Status: SHIPPED | OUTPATIENT
Start: 2024-01-16 | End: 2024-04-25

## 2024-04-25 DIAGNOSIS — F41.1 GENERALIZED ANXIETY DISORDER: ICD-10-CM

## 2024-04-25 RX ORDER — PROPRANOLOL HYDROCHLORIDE 10 MG/1
TABLET ORAL
Qty: 90 TABLET | Refills: 3 | Status: SHIPPED | OUTPATIENT
Start: 2024-04-25

## 2024-04-25 NOTE — TELEPHONE ENCOUNTER
Refill Decision Note   Steffanie Lamarham  is requesting a refill authorization.  Brief Assessment and Rationale for Refill:  Approve     Medication Therapy Plan:         Comments:     Note composed:2:29 PM 04/25/2024             Appointments     Last Visit   1/16/2024 Tara Will MD   Next Visit   Visit date not found Tara Will MD       Patient reported that she is not interested in ILVM program and she is following up with maria esther.    Mariana Stone CNP

## 2024-04-25 NOTE — TELEPHONE ENCOUNTER
No care due was identified.  [facility] Embedded Care Due Messages. Reference number: [id number].   4/25/2024 12:32:25 AM CDT

## 2024-06-25 ENCOUNTER — ON-DEMAND VIRTUAL (OUTPATIENT)
Dept: URGENT CARE | Facility: CLINIC | Age: 34
End: 2024-06-25
Payer: COMMERCIAL

## 2024-06-25 DIAGNOSIS — U07.1 COVID-19: ICD-10-CM

## 2024-06-25 PROCEDURE — 99213 OFFICE O/P EST LOW 20 MIN: CPT | Mod: 95,,, | Performed by: NURSE PRACTITIONER

## 2024-06-25 RX ORDER — NIRMATRELVIR AND RITONAVIR 300-100 MG
KIT ORAL
Qty: 30 TABLET | Refills: 0 | Status: SHIPPED | OUTPATIENT
Start: 2024-06-25 | End: 2024-06-30

## 2024-06-25 RX ORDER — BUPROPION HYDROCHLORIDE 150 MG/1
150 TABLET ORAL DAILY
COMMUNITY
Start: 2024-06-24

## 2024-06-25 NOTE — PROGRESS NOTES
Subjective:      Patient ID: Steffanie Parkinson is a 34 y.o. female.    Vitals:  vitals were not taken for this visit.     Chief Complaint: URI      Visit Type: TELE AUDIOVISUAL    Present with the patient at the time of consultation: TELEMED PRESENT WITH PATIENT: None        Past Medical History:   Diagnosis Date    Migraine without aura and without status migrainosus, not intractable 12/2/2020    Migraines     Morbid obesity 12/2/2020     Past Surgical History:   Procedure Laterality Date    none       Review of patient's allergies indicates:  No Known Allergies  Current Outpatient Medications on File Prior to Visit   Medication Sig Dispense Refill    buPROPion (WELLBUTRIN XL) 150 MG TB24 tablet Take 150 mg by mouth once daily.      diphth,pertus,acell,,tetanus (BOOSTRIX TDAP) 2.5-8-5 Lf-mcg-Lf/0.5mL Syrg injection Inject into the muscle. 0.5 mL 0    norgestimate-ethinyl estradioL (TRI-LO-ESTARYLLA) 0.18/0.215/0.25 mg-25 mcg tablet Take 1 tablet by mouth once daily. 84 tablet 3    ondansetron (ZOFRAN-ODT) 4 MG TbDL Take 2 tablets (8 mg total) by mouth every 12 (twelve) hours as needed (nausea, vomiting). 20 tablet 0    propranoloL (INDERAL) 10 MG tablet TAKE 1 TABLET BY MOUTH EVERY DAY AS NEEDED FOR ANXIETY 90 tablet 3     No current facility-administered medications on file prior to visit.     Family History   Problem Relation Name Age of Onset    No Known Problems Maternal Grandmother      Alcohol abuse Maternal Grandfather Dexter     Drug abuse Mother mom     Kidney disease Mother mom     Bipolar disorder Maternal Aunt calu     Nephrolithiasis Maternal Uncle      Breast cancer Neg Hx      Colon cancer Neg Hx      Ovarian cancer Neg Hx         Medications Ordered                [id number] - [place], [place] - [address]   [address] [facility] [address]    Telephone: [phone]   Fax: [phone]   Hours: Not open 24 hours                          E-Prescribed (1 of 1)              nirmatrelvir-ritonavir (PAXLOVID) 300 mg (150 mg x 2)-100 mg copackaged tablets (EUA)    Sig: Take 3 tablets by mouth 2 (two) times daily. Each dose contains 2 nirmatrelvir (pink tablets) and 1 ritonavir (white tablet). Take all 3 tablets together       Start: 6/25/24     Quantity: 30 tablet Refills: 0                           Ohs Peq Odvv Intake    6/25/2024  5:08 PM CDT - Filed by Patient   What is your current physical address in the event of a medical emergency? [facility]   Are you able to take your vital signs? No   Please attach any relevant images or files          33 yo female with c/o positive covid test. She states symptoms include chills three days ago and fever which has resolved. Yesterday she states she started with sinus pressure, headache, runny nose and cough. She deneis sob and wheezing. She has tried tylenol and dayquil and saline washes.         Constitution: Positive for generalized weakness. Negative for fever.   HENT:  Positive for congestion and postnasal drip. Negative for sore throat.    Cardiovascular:  Negative for sob on exertion.   Respiratory:  Positive for cough and sputum production. Negative for shortness of breath and wheezing.    Allergic/Immunologic: Positive for sneezing.   Neurological:  Negative for headaches.        Objective:   The physical exam was conducted virtually.  LOCATION OF PATIENT home  Physical Exam   Constitutional: She is oriented to person, place, and time. She appears well-developed.   HENT:   Head: Normocephalic and atraumatic.   Ears:   Right Ear: Hearing, tympanic membrane and external ear normal.   Left Ear: Hearing, tympanic membrane and external ear normal.   Nose: Nose normal.   Mouth/Throat: Uvula is midline, oropharynx is clear and moist and mucous membranes are normal.   Eyes: Conjunctivae and EOM are normal. Pupils are equal, round, and reactive to light.   Neck: Neck supple.    Cardiovascular: Normal rate.   Pulmonary/Chest: Effort normal and breath sounds normal.   Musculoskeletal: Normal range of motion.         General: Normal range of motion.   Neurological: She is alert and oriented to person, place, and time.   Skin: Skin is warm.   Psychiatric: Her behavior is normal. Thought content normal.   Nursing note and vitals reviewed.      Assessment:     1. COVID-19        Plan:       The CDC has recently changed guideline to include patient that are Covid-19 positive should stay home until theyve been fever-free without medication for at least 24 hours and their symptoms have been improving for 24 hours.      Important home care recommendations include: monitor your symptoms, if you have trouble breathing please go to the ER. Stay in a separate room from other household members, if possible.  Use a separate bathroom, if possible.  Avoid contact with other members of the household and pets.  Don't share personal household items, like cups, towels, and utensils.  Wear a mask when around other people if able.  Please refer to CDC's websit for more information about what to do if you you're sick and how to notify your contacts.    Stay home except to get medical care. Most people with COVID-19 have mild illness and can recover at home without medical care. Do not leave your home, except to get medical care. Do not visit public areas. Get rest and stay hydrated. Call before you get medical care. Be sure to get care if you have trouble breathing, or have any other emergency warning signs, or if you think it is an emergency.  Avoid public transportation, ride-sharing, or taxis.  Separate yourself from other people.  As much as possible, stay in a specific room and away from other people and pets in your home. If possible, you should use a separate bathroom. If you need to be around other people or animals in or outside of the home, wear a mask.     If someone is showing any of these signs,  seek emergency medical care immediately:  Trouble breathing  Persistent pain or pressure in the chest  New confusion  Inability to wake or stay awake  Pale, gray, or blue-colored skin, lips, or nail beds, depending on skin tone  *This list is not all possible symptoms. Please call your medical provider for any other symptoms that are severe or concerning to you.    Call ahead before visiting your doctor  Call ahead. Many medical visits for routine care are being postponed or done by phone or telemedicine.  If you have a medical appointment that cannot be postponed, call your doctors office, and tell them you have or may have COVID-19. This will help the office protect themselves and other patients.    You dont need to wear the mask if you are alone. If you cant put on a mask (because of trouble breathing, for example), cover your coughs and sneezes in some other way. Try to stay at least 6 feet away from other people. This will help protect the people around you.  Masks should not be placed on young children under age 2 years, anyone who has trouble breathing, or anyone who is not able to remove the mask without help.    Cover your coughs and sneezes  Cover your mouth and nose with a tissue when you cough or sneeze.  Throw away used tissues in a lined trash can.  Immediately wash your hands with soap and water for at least 20 seconds. If soap and water are not available, clean your hands with an alcohol-based hand  that contains at least 60% alcohol.  hands wash light icon  Clean your hands often  Wash your hands often with soap and water for at least 20 seconds. This is especially important after blowing your nose, coughing, or sneezing; going to the bathroom; and before eating or preparing food.  Use hand  if soap and water are not available. Use an alcohol-based hand  with at least 60% alcohol, covering all surfaces of your hands and rubbing them together until they feel dry.  Soap  and water are the best option, especially if hands are visibly dirty.  Avoid touching your eyes, nose, and mouth with unwashed hands.  Handwashing Tips  Avoid sharing personal household items  Do not share dishes, drinking glasses, cups, eating utensils, towels, or bedding with other people in your home.  Wash these items thoroughly after using them with soap and water or put in the .  spraybottle icon  Clean all high-touch surfaces everyday  Clean and disinfect high-touch surfaces in your sick room and bathroom; wear disposable gloves. Let someone else clean and disinfect surfaces in common areas, but you should clean your bedroom and bathroom, if possible.  If a caregiver or other person needs to clean and disinfect a sick persons bedroom or bathroom, they should do so on an as-needed basis. The caregiver/other person should wear a mask and disposable gloves prior to cleaning. They should wait as long as possible after the person who is sick has used the bathroom before coming in to clean and use the bathroom.  High-touch surfaces include phones, remote controls, counters, tabletops, doorknobs, bathroom fixtures, toilets, keyboards, tablets, and bedside tables.    Clean and disinfect areas that may have blood, stool, or body fluids on them.  Use household  and disinfectants. Clean the area or item with soap and water or another detergent if it is dirty. Then, use a household disinfectant.  Be sure to follow the instructions on the label to ensure safe and effective use of the product. Many products recommend keeping the surface wet for several minutes to ensure germs are killed. Many also recommend precautions such as wearing gloves and making sure you have good ventilation during use of the product.  Use a product from EPAs List N: Disinfectants for Coronavirus (COVID-19)external icon.  Complete Disinfection Guidance      PLEASE READ YOUR DISCHARGE INSTRUCTIONS ENTIRELY AS IT CONTAINS  IMPORTANT INFORMATION.    Please drink plenty of fluids.    Please get plenty of rest.    If not allergic, please take over the counter Tylenol (Acetaminophen) and/or Motrin (Ibuprofen) as directed for control of muscle aches, pain, and/or fever.    Please go to the Emergency Department for any shortness of breath or difficulty breathing.    For congestion, runny nose, or post nasal drip please take an over the counter antihistamine medication (Claritin/Zyrtec/Allegra) of your choice as directed or try an over the counter decongestant like Sudafed. You buy this behind the pharmacy counter.  You can also buy combination OTC antihistamine plus decongestant medications such at Zyrtec-D or Claritin-D.  Do not take decongestant if you suffer from high blood pressure.    For cough, you may be prescribed medication.  Take as prescribed.  If you were not prescribed any medication, you can use over the counter cough medications such as Robitussin.  If you have chest congestion you can consider using over the counter Mucinex but make sure you drink plenty of water to encourage mobilization of the secretions.    If you do have high blood pressure or palpitations, it is safe to take Coricidin HBP for relief of sinus symptoms.    Sore throat recommendations: Warm fluids (tea with honey, soup, broth), warm salt water gargles, throat lozenges, rest, hydration.    If you  smoke, please stop smoking.    You must understand that you've received an Urgent Care treatment only and that you may be released before all of your medical problems are known or treated. You, the patient, will arrange for follow up as instructed. If your symptoms worsen or fail to improve you should go to the Emergency Room.      COVID-19  -     nirmatrelvir-ritonavir (PAXLOVID) 300 mg (150 mg x 2)-100 mg copackaged tablets (EUA); Take 3 tablets by mouth 2 (two) times daily. Each dose contains 2 nirmatrelvir (pink tablets) and 1 ritonavir (white tablet). Take  all 3 tablets together  Dispense: 30 tablet; Refill: 0

## 2024-10-17 DIAGNOSIS — N91.4 SECONDARY OLIGOMENORRHEA: ICD-10-CM

## 2024-10-17 DIAGNOSIS — Z30.9 ENCOUNTER FOR CONTRACEPTIVE MANAGEMENT, UNSPECIFIED TYPE: ICD-10-CM

## 2024-10-17 RX ORDER — NORGESTIMATE AND ETHINYL ESTRADIOL 7DAYSX3 LO
1 KIT ORAL DAILY
Qty: 84 TABLET | Refills: 0 | Status: SHIPPED | OUTPATIENT
Start: 2024-10-17

## 2024-10-17 NOTE — TELEPHONE ENCOUNTER
----- Message from Kiara sent at 10/17/2024  9:03 AM CDT -----  Contact: Mobile [phone]  Requesting an RX refill or new RX.    Is this a refill or new RX: New    RX name and strength norgestimate-ethinyl estradioL (TRI-LO-ESTARYLLA) 0.18/0.215/0.25 mg-25 mcg tablet    Is this a 30 day or 90 day RX: 84    Pharmacy name and phone # [id number] [facility] [id number]  Phone# [phone]      The doctors have asked that we provide their patients with the following 2 reminders -- prescription refills can take up to 72 hours, and a friendly reminder that in the future you can use your [facility] account to request refills:

## 2024-10-17 NOTE — TELEPHONE ENCOUNTER
Called pt and spoke about ocp refill request from [facility]. Pt states she's been having trouble getting her refills because the [facility] previously went to no longer carries it, so now the [facility] sent her info over to [facility].     Pt states this Sunday she'll be out of pills, so she's requesting a refill. Preferred pharm is [facility].     Since her annual is coming up in [facility], I scheduled her wwe for 12/5 [facility]. Pt satisfied w appt. Explained in order to get another year refill she'll need to be seen for annual. Pt verbalized understanding.     Pt states she has new insurance and will need that updated. I gave her information on how to update that via pt portal or to call the office to get assistance. Pt verbalized understanding.

## 2025-01-10 ENCOUNTER — PATIENT MESSAGE (OUTPATIENT)
Dept: OBSTETRICS AND GYNECOLOGY | Facility: CLINIC | Age: 35
End: 2025-01-10
Payer: COMMERCIAL